# Patient Record
Sex: MALE | Race: BLACK OR AFRICAN AMERICAN | NOT HISPANIC OR LATINO | Employment: FULL TIME | URBAN - METROPOLITAN AREA
[De-identification: names, ages, dates, MRNs, and addresses within clinical notes are randomized per-mention and may not be internally consistent; named-entity substitution may affect disease eponyms.]

---

## 2023-05-03 ENCOUNTER — HOSPITAL ENCOUNTER (OUTPATIENT)
Dept: DATA CONVERSION | Facility: HOSPITAL | Age: 48
End: 2023-05-03
Attending: INTERNAL MEDICINE | Admitting: INTERNAL MEDICINE
Payer: MEDICAID

## 2023-05-03 DIAGNOSIS — K31.89 OTHER DISEASES OF STOMACH AND DUODENUM: ICD-10-CM

## 2023-05-03 DIAGNOSIS — R13.10 DYSPHAGIA, UNSPECIFIED: ICD-10-CM

## 2023-05-03 DIAGNOSIS — R13.19 OTHER DYSPHAGIA: ICD-10-CM

## 2023-05-03 DIAGNOSIS — K29.50 UNSPECIFIED CHRONIC GASTRITIS WITHOUT BLEEDING: ICD-10-CM

## 2023-05-03 DIAGNOSIS — Q39.6 CONGENITAL DIVERTICULUM OF ESOPHAGUS: ICD-10-CM

## 2023-05-11 LAB
COMPLETE PATHOLOGY REPORT: NORMAL
CONVERTED CLINICAL DIAGNOSIS-HISTORY: NORMAL
CONVERTED FINAL DIAGNOSIS: NORMAL
CONVERTED FINAL REPORT PDF LINK TO COPY AND PASTE: NORMAL
CONVERTED GROSS DESCRIPTION: NORMAL
CONVERTED MICROSCOPIC DESCRIPTION: NORMAL

## 2023-06-05 ENCOUNTER — OFFICE VISIT (OUTPATIENT)
Dept: PRIMARY CARE | Facility: CLINIC | Age: 48
End: 2023-06-05
Payer: MEDICAID

## 2023-06-05 VITALS
SYSTOLIC BLOOD PRESSURE: 142 MMHG | RESPIRATION RATE: 18 BRPM | TEMPERATURE: 98.2 F | DIASTOLIC BLOOD PRESSURE: 92 MMHG | OXYGEN SATURATION: 97 % | HEART RATE: 60 BPM | BODY MASS INDEX: 32.54 KG/M2 | WEIGHT: 260.3 LBS

## 2023-06-05 DIAGNOSIS — Z01.82 ENCOUNTER FOR ALLERGY TESTING: ICD-10-CM

## 2023-06-05 DIAGNOSIS — Z00.00 PHYSICAL EXAM: Primary | ICD-10-CM

## 2023-06-05 PROCEDURE — 90715 TDAP VACCINE 7 YRS/> IM: CPT | Performed by: NURSE PRACTITIONER

## 2023-06-05 PROCEDURE — 1036F TOBACCO NON-USER: CPT | Performed by: NURSE PRACTITIONER

## 2023-06-05 PROCEDURE — 90471 IMMUNIZATION ADMIN: CPT | Performed by: NURSE PRACTITIONER

## 2023-06-05 PROCEDURE — 99396 PREV VISIT EST AGE 40-64: CPT | Performed by: NURSE PRACTITIONER

## 2023-06-05 ASSESSMENT — PATIENT HEALTH QUESTIONNAIRE - PHQ9
SUM OF ALL RESPONSES TO PHQ9 QUESTIONS 1 AND 2: 0
1. LITTLE INTEREST OR PLEASURE IN DOING THINGS: NOT AT ALL
2. FEELING DOWN, DEPRESSED OR HOPELESS: NOT AT ALL

## 2023-06-05 ASSESSMENT — ENCOUNTER SYMPTOMS
WHEEZING: 0
ABDOMINAL PAIN: 0
NAUSEA: 0
STRIDOR: 0
SHORTNESS OF BREATH: 0
VOMITING: 0
COUGH: 0

## 2023-06-05 ASSESSMENT — PAIN SCALES - GENERAL: PAINLEVEL: 0-NO PAIN

## 2023-06-05 NOTE — PATIENT INSTRUCTIONS
Healthy diet and drink plenty of water.  Please have labs drawn-SEE MY CHART FOR RESULTS.  Will call with abnormal results only.  Please schedule all necessary health screenings ophthalmology and dentist.    Follow-up in 1 YEAR sooner if needed.  Call office any new concerns.

## 2023-06-05 NOTE — PROGRESS NOTES
"Subjective   Patient ID: Zen Boothe is a 47 y.o. male who presents for Annual Exam (Would like to get allergy testing. States that when he eats/drinks certain foods he feels like there is a ball in his throat. ).    HPI     Patient presents to clinic for yearly physical.  Currently does not take any prescription medications.    Today he states he is feeling well.  Patient has been evaluated in the ER several times for chest pain most recent visit March 16, 2023 cardiac work-up negative ( see ER notes).  Patient has been seen and followed up with cardiology.   He states ibuprofen helps with his chest pains.     Today patient complains of having \"heavy tonsils\" after drinking alcohol.  He states he feels as though something is stuck in his throat.  He would like to be evaluated for allergies.  He denies any difficulty breathing or difficulty swallowing.    He is without any other specific complaints or concerns.     Appetite normal bowel bladder normal.    Review of Systems   HENT:          SEE HPI   Respiratory:  Negative for cough, shortness of breath, wheezing and stridor.    Gastrointestinal:  Negative for abdominal pain, nausea and vomiting.   All other systems reviewed and are negative.      Objective   BP (!) 142/92 (BP Location: Right arm, Patient Position: Sitting, BP Cuff Size: Large adult)   Pulse 60   Temp 36.8 °C (98.2 °F) (Temporal)   Resp 18   Wt 118 kg (260 lb 4.8 oz)   SpO2 97%   BMI 32.54 kg/m²     Physical Exam  Vitals reviewed.   Constitutional:       Appearance: Normal appearance. He is not ill-appearing.   HENT:      Right Ear: Tympanic membrane and external ear normal.      Left Ear: Tympanic membrane and external ear normal.      Mouth/Throat:      Mouth: Mucous membranes are moist.      Pharynx: Oropharynx is clear.      Comments: No Uvula enlargement noted  Eyes:      Pupils: Pupils are equal, round, and reactive to light.   Cardiovascular:      Rate and Rhythm: Normal rate and " regular rhythm.   Pulmonary:      Effort: Pulmonary effort is normal.      Breath sounds: Normal breath sounds.   Abdominal:      General: Bowel sounds are normal.      Palpations: Abdomen is soft.   Musculoskeletal:         General: Normal range of motion.      Cervical back: Normal range of motion and neck supple.   Skin:     General: Skin is warm and dry.   Neurological:      Mental Status: He is alert and oriented to person, place, and time.   Psychiatric:         Mood and Affect: Mood normal.         Assessment/Plan   Problem List Items Addressed This Visit    None  Visit Diagnoses       Physical exam    -  Primary    Relevant Orders    CBC    Comprehensive Metabolic Panel    Lipid Panel    Hemoglobin A1C    Prostate Specific Antigen, Screen    Vitamin D 25-Hydroxy,Total    Syphilis Screen with Reflex    Hepatitis B Surface Antibody    Hepatitis C Antibody    C. Trachomatis / N. Gonorrhoeae, Amplified Detection    TRICH VAGINALIS, AMPLIFIED    HIV 1/2 Antigen/Antibody Screen with Reflex to Confirmation    Encounter for allergy testing        Relevant Orders    Referral to Allergy

## 2023-06-06 ENCOUNTER — LAB (OUTPATIENT)
Dept: LAB | Facility: LAB | Age: 48
End: 2023-06-06
Payer: MEDICAID

## 2023-06-06 DIAGNOSIS — Z00.00 PHYSICAL EXAM: ICD-10-CM

## 2023-06-06 LAB
ALANINE AMINOTRANSFERASE (SGPT) (U/L) IN SER/PLAS: 36 U/L (ref 10–52)
ALBUMIN (G/DL) IN SER/PLAS: 4.3 G/DL (ref 3.4–5)
ALKALINE PHOSPHATASE (U/L) IN SER/PLAS: 46 U/L (ref 33–120)
ANION GAP IN SER/PLAS: 10 MMOL/L (ref 10–20)
ASPARTATE AMINOTRANSFERASE (SGOT) (U/L) IN SER/PLAS: 30 U/L (ref 9–39)
BILIRUBIN TOTAL (MG/DL) IN SER/PLAS: 1.6 MG/DL (ref 0–1.2)
CALCIDIOL (25 OH VITAMIN D3) (NG/ML) IN SER/PLAS: 11 NG/ML
CALCIUM (MG/DL) IN SER/PLAS: 9.1 MG/DL (ref 8.6–10.3)
CARBON DIOXIDE, TOTAL (MMOL/L) IN SER/PLAS: 29 MMOL/L (ref 21–32)
CHLORIDE (MMOL/L) IN SER/PLAS: 103 MMOL/L (ref 98–107)
CHOLESTEROL (MG/DL) IN SER/PLAS: 201 MG/DL (ref 0–199)
CHOLESTEROL IN HDL (MG/DL) IN SER/PLAS: 56.7 MG/DL
CHOLESTEROL/HDL RATIO: 3.5
CREATININE (MG/DL) IN SER/PLAS: 1.06 MG/DL (ref 0.5–1.3)
ERYTHROCYTE DISTRIBUTION WIDTH (RATIO) BY AUTOMATED COUNT: 12 % (ref 11.5–14.5)
ERYTHROCYTE MEAN CORPUSCULAR HEMOGLOBIN CONCENTRATION (G/DL) BY AUTOMATED: 32.7 G/DL (ref 32–36)
ERYTHROCYTE MEAN CORPUSCULAR VOLUME (FL) BY AUTOMATED COUNT: 95 FL (ref 80–100)
ERYTHROCYTES (10*6/UL) IN BLOOD BY AUTOMATED COUNT: 4.65 X10E12/L (ref 4.5–5.9)
ESTIMATED AVERAGE GLUCOSE FOR HBA1C: 94 MG/DL
GFR MALE: 87 ML/MIN/1.73M2
GLUCOSE (MG/DL) IN SER/PLAS: 86 MG/DL (ref 74–99)
HEMATOCRIT (%) IN BLOOD BY AUTOMATED COUNT: 44.1 % (ref 41–52)
HEMOGLOBIN (G/DL) IN BLOOD: 14.4 G/DL (ref 13.5–17.5)
HEMOGLOBIN A1C/HEMOGLOBIN TOTAL IN BLOOD: 4.9 %
HEPATITIS B VIRUS SURFACE AB (MIU/ML) IN SERUM: >1000 MIU/ML
HEPATITIS C VIRUS AB PRESENCE IN SERUM: NONREACTIVE
HIV 1/ 2 AG/AB SCREEN: NONREACTIVE
LDL: 130 MG/DL (ref 0–99)
LEUKOCYTES (10*3/UL) IN BLOOD BY AUTOMATED COUNT: 4.9 X10E9/L (ref 4.4–11.3)
NRBC (PER 100 WBCS) BY AUTOMATED COUNT: 0 /100 WBC (ref 0–0)
PLATELETS (10*3/UL) IN BLOOD AUTOMATED COUNT: 215 X10E9/L (ref 150–450)
POTASSIUM (MMOL/L) IN SER/PLAS: 4 MMOL/L (ref 3.5–5.3)
PROSTATE SPECIFIC ANTIGEN,SCREEN: 0.41 NG/ML (ref 0–4)
PROTEIN TOTAL: 7.2 G/DL (ref 6.4–8.2)
SODIUM (MMOL/L) IN SER/PLAS: 138 MMOL/L (ref 136–145)
SYPHILIS TOTAL AB: NONREACTIVE
TRIGLYCERIDE (MG/DL) IN SER/PLAS: 72 MG/DL (ref 0–149)
UREA NITROGEN (MG/DL) IN SER/PLAS: 12 MG/DL (ref 6–23)
VLDL: 14 MG/DL (ref 0–40)

## 2023-06-06 PROCEDURE — 80061 LIPID PANEL: CPT

## 2023-06-06 PROCEDURE — 87389 HIV-1 AG W/HIV-1&-2 AB AG IA: CPT

## 2023-06-06 PROCEDURE — 83036 HEMOGLOBIN GLYCOSYLATED A1C: CPT

## 2023-06-06 PROCEDURE — 86803 HEPATITIS C AB TEST: CPT

## 2023-06-06 PROCEDURE — 85027 COMPLETE CBC AUTOMATED: CPT

## 2023-06-06 PROCEDURE — 86780 TREPONEMA PALLIDUM: CPT

## 2023-06-06 PROCEDURE — 87491 CHLMYD TRACH DNA AMP PROBE: CPT

## 2023-06-06 PROCEDURE — 36415 COLL VENOUS BLD VENIPUNCTURE: CPT

## 2023-06-06 PROCEDURE — 80053 COMPREHEN METABOLIC PANEL: CPT

## 2023-06-06 PROCEDURE — 86706 HEP B SURFACE ANTIBODY: CPT

## 2023-06-06 PROCEDURE — 87591 N.GONORRHOEAE DNA AMP PROB: CPT

## 2023-06-06 PROCEDURE — 87661 TRICHOMONAS VAGINALIS AMPLIF: CPT

## 2023-06-06 PROCEDURE — 84153 ASSAY OF PSA TOTAL: CPT

## 2023-06-06 PROCEDURE — 82306 VITAMIN D 25 HYDROXY: CPT

## 2023-06-07 LAB
CHLAMYDIA TRACH., AMPLIFIED: NEGATIVE
N. GONORRHEA, AMPLIFIED: NEGATIVE
TRICHOMONAS VAGINALIS: NEGATIVE

## 2023-06-08 DIAGNOSIS — E55.9 VITAMIN D DEFICIENCY: Primary | ICD-10-CM

## 2023-06-08 RX ORDER — ERGOCALCIFEROL 1.25 MG/1
50000 CAPSULE ORAL
Qty: 4 CAPSULE | Refills: 1 | Status: SHIPPED | OUTPATIENT
Start: 2023-06-08 | End: 2023-07-11 | Stop reason: ALTCHOICE

## 2023-06-09 ENCOUNTER — TELEPHONE (OUTPATIENT)
Dept: PRIMARY CARE | Facility: CLINIC | Age: 48
End: 2023-06-09
Payer: MEDICAID

## 2023-06-09 NOTE — TELEPHONE ENCOUNTER
Called and notified patient of lab results and CNP recommendations. Patient verbalized understanding.

## 2023-06-09 NOTE — TELEPHONE ENCOUNTER
----- Message from GUY Handley sent at 6/8/2023  2:15 PM EDT -----  Regarding: labs  Please notify patient with lab results.  Vitamin D level deficient we will start patient on vitamin D 50,000 units 1 tablet weekly x8 weeks then patient to start over-the-counter vitamin D3 2000 international units 1 tablet daily.  Mild elevation also noted with cholesterol level advise healthy lifestyle changes with diet low-cholesterol low-fat eating more plant-based foods and regular exercises.  Blood work also shows some mild anemia although trending upward from previous reading.  Advised patient to eat iron rich foods.  ----- Message -----  From: Lab, Background User  Sent: 6/6/2023   1:54 PM EDT  To: GUY Handley

## 2023-06-12 ENCOUNTER — CLINICAL SUPPORT (OUTPATIENT)
Dept: PRIMARY CARE | Facility: CLINIC | Age: 48
End: 2023-06-12
Payer: MEDICAID

## 2023-06-12 VITALS — DIASTOLIC BLOOD PRESSURE: 84 MMHG | SYSTOLIC BLOOD PRESSURE: 120 MMHG

## 2023-06-12 DIAGNOSIS — Z01.30 BLOOD PRESSURE CHECK: ICD-10-CM

## 2023-06-12 PROCEDURE — 99211 OFF/OP EST MAY X REQ PHY/QHP: CPT | Performed by: NURSE PRACTITIONER

## 2023-07-11 ENCOUNTER — OFFICE VISIT (OUTPATIENT)
Dept: PRIMARY CARE | Facility: CLINIC | Age: 48
End: 2023-07-11
Payer: MEDICAID

## 2023-07-11 VITALS
SYSTOLIC BLOOD PRESSURE: 132 MMHG | DIASTOLIC BLOOD PRESSURE: 88 MMHG | BODY MASS INDEX: 32.39 KG/M2 | TEMPERATURE: 97.2 F | OXYGEN SATURATION: 97 % | RESPIRATION RATE: 17 BRPM | HEART RATE: 70 BPM | WEIGHT: 259.1 LBS

## 2023-07-11 DIAGNOSIS — R52 PAIN: ICD-10-CM

## 2023-07-11 DIAGNOSIS — R07.9 CHEST PAIN, UNSPECIFIED TYPE: Primary | ICD-10-CM

## 2023-07-11 PROCEDURE — 1036F TOBACCO NON-USER: CPT | Performed by: NURSE PRACTITIONER

## 2023-07-11 PROCEDURE — 99214 OFFICE O/P EST MOD 30 MIN: CPT | Performed by: NURSE PRACTITIONER

## 2023-07-11 PROCEDURE — 93000 ELECTROCARDIOGRAM COMPLETE: CPT | Performed by: NURSE PRACTITIONER

## 2023-07-11 RX ORDER — IBUPROFEN 600 MG/1
600 TABLET ORAL EVERY 6 HOURS PRN
Qty: 40 TABLET | Refills: 0 | Status: SHIPPED | OUTPATIENT
Start: 2023-07-11 | End: 2023-08-10

## 2023-07-11 ASSESSMENT — ENCOUNTER SYMPTOMS
FATIGUE: 0
NAUSEA: 0
FEVER: 0
SHORTNESS OF BREATH: 0
COUGH: 0
VOMITING: 0
PALPITATIONS: 0

## 2023-07-11 ASSESSMENT — PAIN SCALES - GENERAL: PAINLEVEL: 0-NO PAIN

## 2023-07-11 NOTE — PROGRESS NOTES
"Subjective   Patient ID: Zen Boothe is a 47 y.o. male who presents for Follow-up (Still having chest pain. Unsure if its stress related or gas. Reports ongoing for 2 years. Comes and goes. When he starts working and moving feels it underneath the breast area.     HPI   Patient presents to clinic with complaint of \"still having chest pain\".  Patient states he can feel pain under the right breast nonradiating denies feeling short of breath denies nausea vomiting.  He rates the pain at 3 out of 4 with movement.  He admits that he has a very physical job.  Patient has been seen several times in the past for chest pain in the ER and has been referred to cardiology who suspect noncardiac etiology for patient's chest pain.  Patient had stress echo February 7, 2023 negative for ischemia.      Patient states he has been taking ibuprofen as needed and admits that it helps with the pain.        Review of Systems   Constitutional:  Negative for fatigue and fever.   Respiratory:  Negative for cough and shortness of breath.    Cardiovascular:  Positive for chest pain. Negative for palpitations and leg swelling.   Gastrointestinal:  Negative for nausea and vomiting.   All other systems reviewed and are negative.      Objective   /88 (BP Location: Left arm, Patient Position: Sitting, BP Cuff Size: Large adult)   Pulse 70   Temp 36.2 °C (97.2 °F) (Temporal)   Resp 17   Wt 118 kg (259 lb 1.6 oz)   SpO2 97%   BMI 32.39 kg/m²     Physical Exam  Constitutional:       Appearance: Normal appearance. He is not ill-appearing or toxic-appearing.   Cardiovascular:      Rate and Rhythm: Normal rate and regular rhythm.      Comments: Negative tenderness with palpation over chest wall.  Pulmonary:      Effort: Pulmonary effort is normal.      Breath sounds: Normal breath sounds.   Musculoskeletal:         General: No tenderness. Normal range of motion.   Skin:     General: Skin is warm and dry.      Coloration: Skin is not " jaundiced.   Neurological:      Mental Status: He is alert and oriented to person, place, and time.         Assessment/Plan   Problem List Items Addressed This Visit    None  Visit Diagnoses       Chest pain, unspecified type    -  Primary    Relevant Orders    ECG 12 lead (Clinic Performed) (Completed)-normal sinus rhythm without ST-T wave abnormality  Follow-up with cardiology as scheduled.  To ER if needed.    Pain        Relevant Medications    ibuprofen 600 mg tablet

## 2023-07-11 NOTE — PATIENT INSTRUCTIONS
Your EKG is normal.  Your Chest pain is likely caused by muscular pain  Ibuprofen as needed  Healthy diet and drink plenty of water.  Follow-up with cardiology as scheduled  To ER if needed.  Call office any new concerns.

## 2023-08-24 ENCOUNTER — TELEMEDICINE (OUTPATIENT)
Dept: PRIMARY CARE | Facility: CLINIC | Age: 48
End: 2023-08-24
Payer: MEDICAID

## 2023-08-24 DIAGNOSIS — M94.0 COSTOCHONDRITIS: Primary | ICD-10-CM

## 2023-08-24 DIAGNOSIS — M79.18 MUSCULOSKELETAL PAIN: ICD-10-CM

## 2023-08-24 PROCEDURE — 99212 OFFICE O/P EST SF 10 MIN: CPT | Performed by: NURSE PRACTITIONER

## 2023-08-24 RX ORDER — IBUPROFEN 800 MG/1
800 TABLET ORAL EVERY 8 HOURS PRN
Qty: 30 TABLET | Refills: 1 | Status: SHIPPED | OUTPATIENT
Start: 2023-08-24 | End: 2023-10-16 | Stop reason: SDUPTHER

## 2023-08-24 NOTE — PROGRESS NOTES
Subjective   Patient ID: Zen Boothe is a 48 y.o. male who presents for VIRTUAL VISIT.    HPI   Patient seen via virtual visit follow-up.  He states he continues to have chest burning/chest discomfort.   Patient states he has aching daily in the chest that is relieved by ibuprofen.  Pain is nonradiating and states he noticed the pain on both sides of his upper chest area.  He denies feeling short of breath nausea vomiting cough fevers.  Patient has been seen and evaluated by Cardiology twice this year-Dr. Lou, he has also had ER visits for chest pain.  Per cardiology notes suspect chest pain is not cardiac related, Stress echo shows no ischemia February 7, 2023.     He tells me he continues to work daily without problems.  He also admits he has a very physical job.    Discussed with patient possibility of chest discomfort related to anxiety.  We also discussed physical therapy.  Patient would like to have physical therapy prior to starting antianxiety medications.      Review of Systems    Virtual visit see HPI.    Objective   There were no vitals taken for this visit.    Physical Exam  Virtual visit patient appears to be in no acute distress able to speak and spoke sentences.  Assessment/Plan   Problem List Items Addressed This Visit    None  Visit Diagnoses       Costochondritis    -  Primary  Musculoskeletal pain        Relevant Medications    ibuprofen 800 mg tablet    Other Relevant Orders    Referral to Physical Therapy  Follow cardiology as needed.  To ER if needed.

## 2023-10-16 ENCOUNTER — OFFICE VISIT (OUTPATIENT)
Dept: PRIMARY CARE | Facility: CLINIC | Age: 48
End: 2023-10-16
Payer: MEDICAID

## 2023-10-16 VITALS
BODY MASS INDEX: 31.75 KG/M2 | SYSTOLIC BLOOD PRESSURE: 129 MMHG | HEART RATE: 78 BPM | TEMPERATURE: 98 F | OXYGEN SATURATION: 98 % | DIASTOLIC BLOOD PRESSURE: 75 MMHG | WEIGHT: 254 LBS

## 2023-10-16 DIAGNOSIS — K21.9 GASTROESOPHAGEAL REFLUX DISEASE WITHOUT ESOPHAGITIS: ICD-10-CM

## 2023-10-16 DIAGNOSIS — M94.0 COSTOCHONDRITIS: ICD-10-CM

## 2023-10-16 DIAGNOSIS — M79.18 MUSCULOSKELETAL PAIN: ICD-10-CM

## 2023-10-16 DIAGNOSIS — Z09 HOSPITAL DISCHARGE FOLLOW-UP: Primary | ICD-10-CM

## 2023-10-16 PROCEDURE — 99214 OFFICE O/P EST MOD 30 MIN: CPT | Performed by: NURSE PRACTITIONER

## 2023-10-16 PROCEDURE — 1036F TOBACCO NON-USER: CPT | Performed by: NURSE PRACTITIONER

## 2023-10-16 RX ORDER — IBUPROFEN 800 MG/1
800 TABLET ORAL EVERY 8 HOURS PRN
Qty: 30 TABLET | Refills: 1 | Status: SHIPPED | OUTPATIENT
Start: 2023-10-16 | End: 2023-11-10 | Stop reason: SDUPTHER

## 2023-10-16 RX ORDER — HYDROGEN PEROXIDE 3 %
20 SOLUTION, NON-ORAL MISCELLANEOUS
COMMUNITY
End: 2023-10-16 | Stop reason: DRUGHIGH

## 2023-10-16 RX ORDER — PANTOPRAZOLE SODIUM 40 MG/1
40 TABLET, DELAYED RELEASE ORAL DAILY
Qty: 30 TABLET | Refills: 3 | Status: SHIPPED | OUTPATIENT
Start: 2023-10-16 | End: 2023-11-10 | Stop reason: SDUPTHER

## 2023-10-16 ASSESSMENT — ENCOUNTER SYMPTOMS
SHORTNESS OF BREATH: 0
VOMITING: 0
ABDOMINAL PAIN: 0
NAUSEA: 0
COUGH: 0
WHEEZING: 0

## 2023-10-16 NOTE — PROGRESS NOTES
Subjective   Patient ID: Zen Boothe is a 48 y.o. male who presents for Follow-up.    HPI     Patient presents to clinic for follow-up visit.    Patient was recently seen in [facility] on 10/11/2023 for intermittent chest pain.  Twelve-lead EKG normal sinus rhythm.  Patient has had previous work-up here at  seen by cardiology with negative cardiac exams.      Today patient complains of burning in the chest states he has been taking over-the-counter Nexium with relief of symptoms.      Patient without any other specific complaints or concerns today.      Appetite normal bowel and bladder normal.    Review of Systems   Respiratory:  Negative for cough, shortness of breath and wheezing.    Cardiovascular:  Negative for chest pain.        See HPI   Gastrointestinal:  Negative for abdominal pain, nausea and vomiting.   All other systems reviewed and are negative.      Objective   /75 (BP Location: Right arm, Patient Position: Sitting, BP Cuff Size: Large adult)   Pulse 78   Temp 36.7 °C (98 °F)   Wt 115 kg (254 lb)   SpO2 98%   BMI 31.75 kg/m²     Physical Exam  Vitals reviewed.   Constitutional:       Appearance: Normal appearance. He is not ill-appearing or toxic-appearing.   Cardiovascular:      Rate and Rhythm: Normal rate and regular rhythm.   Pulmonary:      Effort: Pulmonary effort is normal.      Breath sounds: Normal breath sounds.   Skin:     General: Skin is warm and dry.   Neurological:      Mental Status: He is alert and oriented to person, place, and time.         Assessment/Plan   Problem List Items Addressed This Visit    None  Visit Diagnoses       Hospital discharge follow-up    -  Primary    Gastroesophageal reflux disease without esophagitis        Relevant Medications    pantoprazole (ProtoNix) 40 mg EC tablet  Follow-up for 6 months sooner if needed.    Costochondritis        Relevant Medications    ibuprofen 800 mg tablet    pantoprazole (ProtoNix) 40 mg EC tablet     Musculoskeletal pain        Relevant Medications    ibuprofen 800 mg tablet

## 2023-10-22 PROBLEM — R09.A2 GLOBUS SENSATION: Status: ACTIVE | Noted: 2023-10-22

## 2023-10-22 PROBLEM — B35.1 ONYCHOMYCOSIS OF TOENAIL: Status: ACTIVE | Noted: 2023-10-22

## 2023-10-22 PROBLEM — M53.86 SCIATICA ASSOCIATED WITH DISORDER OF LUMBAR SPINE: Status: ACTIVE | Noted: 2023-10-22

## 2023-10-22 PROBLEM — H61.20 CERUMEN IMPACTION: Status: ACTIVE | Noted: 2023-10-22

## 2023-10-22 PROBLEM — R30.0 DYSURIA: Status: ACTIVE | Noted: 2023-10-22

## 2023-10-22 PROBLEM — L60.8 MELANONYCHIA: Status: ACTIVE | Noted: 2023-10-22

## 2023-10-22 PROBLEM — M54.9 CHRONIC BACK PAIN: Status: ACTIVE | Noted: 2023-10-22

## 2023-10-22 PROBLEM — M65.4 DE QUERVAIN'S TENOSYNOVITIS: Status: ACTIVE | Noted: 2023-10-22

## 2023-10-22 PROBLEM — L73.1 PSEUDOFOLLICULITIS BARBAE: Status: ACTIVE | Noted: 2023-10-22

## 2023-10-22 PROBLEM — G56.03 BILATERAL CARPAL TUNNEL SYNDROME: Status: ACTIVE | Noted: 2022-03-02

## 2023-10-22 PROBLEM — E55.9 VITAMIN D DEFICIENCY: Status: ACTIVE | Noted: 2023-10-22

## 2023-10-22 PROBLEM — G89.29 CHRONIC BACK PAIN: Status: ACTIVE | Noted: 2023-10-22

## 2023-10-22 PROBLEM — R59.0 LYMPHADENOPATHY OF RIGHT CERVICAL REGION: Status: ACTIVE | Noted: 2023-10-22

## 2023-10-22 RX ORDER — CLINDAMYCIN PHOSPHATE 10 MG/G
GEL TOPICAL
COMMUNITY

## 2023-10-22 RX ORDER — BENZOYL PEROXIDE 10 G/100G
GEL TOPICAL DAILY
COMMUNITY
End: 2024-02-05 | Stop reason: ALTCHOICE

## 2023-10-24 ENCOUNTER — APPOINTMENT (OUTPATIENT)
Dept: PHYSICAL THERAPY | Facility: CLINIC | Age: 48
End: 2023-10-24
Payer: MEDICAID

## 2023-10-26 ENCOUNTER — EVALUATION (OUTPATIENT)
Dept: PHYSICAL THERAPY | Facility: CLINIC | Age: 48
End: 2023-10-26
Payer: MEDICAID

## 2023-10-26 DIAGNOSIS — M94.0 COSTOCHONDRITIS: Primary | ICD-10-CM

## 2023-10-26 PROCEDURE — 97161 PT EVAL LOW COMPLEX 20 MIN: CPT | Mod: GP

## 2023-10-26 PROCEDURE — 97110 THERAPEUTIC EXERCISES: CPT | Mod: GP

## 2023-10-26 NOTE — PROGRESS NOTES
Physical Therapy    Physical Therapy Evaluation    Patient Name: Zen Boothe  MRN: [id number]  Today's Date: 10/26/23  Time Calculation  Start Time: 0830 (Patient arrived late to evaluation.)  Stop Time: 0900  Time Calculation (min): 30 min    Therapy Diagnoses:    1. Costochondritis  Follow Up In Physical Therapy          Visit #: 1     Insurance:  -authorization required: Yes  -number of visits authorized: TBD  -authorization dates: TBD      Subjective:  Reason For Visit: Initial Evaluation  Referred by: Kerline Garza, ROSALIND-CNP   - CC: Patient arrives with complaint of chest/rib pain.   - ADDITIONAL: Patient reports intermittent and wondering pain/sensation of heaviness. Most commonly experiences pain under R axilla and inferior portion of R rib cage, but occasionally travels under L axilla as well. Sensations of heaviness over sternum are light, but constant. Bouts of symptoms can be relieved for up to 2 months at time before returning.   - DOI: 10/01/2022  - FINESSE: Insidious onset  - IMAGING: x-ray (No evidence of acute cardiopulmonary process)   - HX: has received ECG and EGD performed within last 6 months  - PAIN: CURRENT: (5/10); BEST: (0/10); WORST: (5/10); LOCATION: (rib cage ASA); Description: sharp  - ALLEVIATES PAIN: ibuprofen  - EXACERBATES PAIN: none  - CURRENT MEDICAL MANAGEMENT: has had multiple cardiac tests performed without significant findings and recently started heart burn medication per patient. Has went to ER multiple times for work up due to pain.   - PLOF: Patient reports no functional limitations prior to injury.  - FUNCTIONAL LIMITATIONS: No limits.   - LIVING ENVIRONMENT: No concerns.   - WORK: Heavy lifting required. Long hours in forward flexed position  - EXERCISE: Has not exercised in 4 months.   - PATIENT'S GOAL: Reduce pain        Precautions:  Fall Risk: None  Cancer (unspecified), diabetes       Objective:   QuickDASH: 35    OBSERVATION: Slight yellow discoloration of  sclera.     POSTURE: very poor - forward rounded shoulders    AROM (values in degrees)   Scaption R/L: 150 / 160  ER R/L: WNL  ER in 90 deg R/L: WNL   IR (Apley) R/L: WNL     MMT (values out of 5)   Flexion R/L: 5 / 5  Abduction R/L: 5 / 5  Adduction R/L: 5 / 5  ER R/L: 5 / 5  IR R/L: 5 / 5  Extension R/L: 5 / 5  Elbow flexion R/L: 5 / 5  Elbow extension R/L: 5 / 5    PALPATION: Denies pain with palpation throughout intercostals, costal facets, and sternum on R.       FLEXIBILITY: Patient demoed decreased flexibility within bilateral Ue's and reports aching sensation in throughout rib cage with stretching.       Goals:  -QuickDash= </= 20 % disability to indicate a significant improvement in overall function.   -Patient will demo correct posture with min to no cueing to allow for correct loading strategy   -Patient will demo mild to no limitation AROM of the R shoulder to allow for correct mechanics with functional mobility.   -Patient will demo pushing, pulling, and lifting with good mechanics to avoid future discomfort or injury to the shoulder.   -Patient will report no more than 1/10 with all activities required for job and household chores.       Treatment:   1) Initial evaluation   2) Patient educated on POC, HEP, and current condition.   3) Therapeutic exercise performed  4) HEP Provided (See Below)     Access Code: [email or web address]  URL: [email or web address]  Date: 10/26/2023  Prepared by: Jayson Berger    Exercises  - Seated Scapular Retraction  - 4-6 x daily - 7 x weekly - 10 reps - 3 sec hold  - Doorway Pec Stretch at 60 Degrees Abduction with Arm Straight  - 2 x daily - 7 x weekly - 2 sets - 30 sec hold  - Seated Thoracic Lumbar Extension with Pectoralis Stretch  - 2 x daily - 7 x weekly - 3 sets - 10 reps - 3 sec hold  - Standing Shoulder Horizontal Abduction with Resistance  - 1 x daily - 7 x weekly - 3 sets - 10 reps      Assessment:   Zen Boothe is a 48 y.o. year old male who  participated in a physical therapy evaluation today due to complaint of chest/rib pain R>L. Patient reports intermittent and wondering pain/sensation of heaviness. Most commonly experiences pain under R axilla and inferior portion of R rib cage, but occasionally travels under L axilla as well. Sensations of heaviness over sternum are light, but constant. Bouts of symptoms can be relieved for up to 2 months at time before returning. No known cause. Familiar symptoms unable to be provoked during today's session, however, patient reported feelings of tightness and aching in affected areas with postural corrections. Based on extensive patient workup and stability of symptoms over the past year, the patient is safe to see PT with monitoring for symptom changes and red flag development. Their impairments include Pain, Active ROM, Passive ROM, Flexibility, and Joint mobility. Zen will benefit from continued skilled physical therapy as well as development of a home exercise program to address current impairments and progress towards return to their prior level of function without pain/limitations.     Plan:     Recommended Treatment:  Therapeutic exercise, Manual therapy, Home program instruction and progression, Neuromuscular re-education, Therapeutic activities, Instruction in activity modification, Cryotherapy, and Dry Needling    Recommended Visits: 1 visits x 4-6 weeks     Patient in agreement with POC.

## 2023-10-26 NOTE — Clinical Note
October 26, 2023     Patient: Zen Boothe   YOB: 1975   Date of Visit: 10/26/2023       To Whom It May Concern:    It is my medical opinion that Zen Boothe {Work release (duty restriction):35190}.    If you have any questions or concerns, please don't hesitate to call.         Sincerely,        Jayson Berger, PT    CC: No Recipients

## 2023-10-26 NOTE — Clinical Note
October 26, 2023     Patient: Zen Boothe   YOB: 1975   Date of Visit: 10/26/2023       To Whom it May Concern:    Zen Boothe was seen in my clinic on 10/26/2023. He {Return to school/sport:36697}.    If you have any questions or concerns, please don't hesitate to call.         Sincerely,          Jayson Berger, PT        CC: No Recipients

## 2023-11-06 ENCOUNTER — DOCUMENTATION (OUTPATIENT)
Dept: PHYSICAL THERAPY | Facility: CLINIC | Age: 48
End: 2023-11-06
Payer: MEDICAID

## 2023-11-06 NOTE — PROGRESS NOTES
Physical Therapy                 Therapy Communication Note    Patient Name: Zen Boothe  MRN: [id number]  Today's Date: 11/6/2023     Discipline: Physical Therapy    Missed Visit Reason:  Forgot    Missed Time: No Show    Comment: Called patient. He reported forgetting appointment and states he will be at next appointment. Was provided date/time of next appointment.

## 2023-11-10 DIAGNOSIS — M79.18 MUSCULOSKELETAL PAIN: ICD-10-CM

## 2023-11-10 DIAGNOSIS — K21.9 GASTROESOPHAGEAL REFLUX DISEASE WITHOUT ESOPHAGITIS: ICD-10-CM

## 2023-11-10 DIAGNOSIS — M94.0 COSTOCHONDRITIS: ICD-10-CM

## 2023-11-10 RX ORDER — PANTOPRAZOLE SODIUM 40 MG/1
40 TABLET, DELAYED RELEASE ORAL DAILY
Qty: 90 TABLET | Refills: 1 | Status: SHIPPED | OUTPATIENT
Start: 2023-11-10 | End: 2024-02-05 | Stop reason: ALTCHOICE

## 2023-11-10 RX ORDER — IBUPROFEN 800 MG/1
800 TABLET ORAL EVERY 8 HOURS PRN
Qty: 30 TABLET | Refills: 2 | Status: SHIPPED | OUTPATIENT
Start: 2023-11-10 | End: 2024-05-31 | Stop reason: SDUPTHER

## 2023-11-10 NOTE — TELEPHONE ENCOUNTER
-abnormal C spine Xray with possible signs of C spine instability, MRi ordered and patient refused 2/17, on pain meds, discussed AT length, see 2/17 and 2/18 note above, ordered with premediation now. Concern for possible infection also given acuity of pain  -signs of C spine instability, prevertebral abscess likely with fluid collection with widening in C spine, NS recs C collar- patient refused, and Xrays, refused, and MRi full spine when stable. Cannot do any intervention now as unstable bc of kidneys.   -ent consulted for retropharyngeal abscess per ns and id recs, will likely scope if amenable at bedside (he refused). They rec MRI C spien with contrast- but cannot get due to kidneys  -refused c collar xrays  -NS recs MRI all spine, ID/ENT rec repeat MRI C spine for better pictures. (cannot do contrast). He is likely nto going to sit for all the spine right now 2/21 per my exam and nursing. Placed repeat flex xrays and C spine MRi orders in now. They called him at 3 pm but anesthesia is coming at 330 pm to do the line he said he would now do this afternoon after a cigarette, so nursing to tell them to come back. IR consulted to sample the vertebral involvement, they report they wanted to review further imaging once in to see if they needed - will f/u thoughts but likely suspect wont need sampling of the spine as this appear more chroinc, retropharyngeal collection however likely needs further addressed. CT also pending as rec for ENT as well.   -ID following- on dapto and Ceftriaxone     Patient wanted you to give him a call. I think he wanted a refill on 2 of his medications. Was hard to understand him, but he requested to talk to you

## 2023-11-10 NOTE — TELEPHONE ENCOUNTER
Rx Refill Request Telephone Encounter    Name:  Zen Boothe  :  [id number]  Medication Name:    Requested Prescriptions     Pending Prescriptions Disp Refills    pantoprazole (ProtoNix) 40 mg EC tablet 90 tablet 3     Sig: Take 1 tablet (40 mg) by mouth once daily. Do not crush, chew, or split.    ibuprofen 800 mg tablet 30 tablet 3     Sig: Take 1 tablet (800 mg) by mouth every 8 hours if needed for mild pain (1 - 3) or moderate pain (4 - 6) (pain). TAKE WITH FOOD   Specific Pharmacy location:    [id number] [place] [id number] [facility] [address]  Phone: [phone] Fax: [phone]  Date of last appointment:  10/16/2023  Date of next appointment:  2024 w/ Dr Sarmiento  Best number to reach patient:  [phone]

## 2023-11-14 ENCOUNTER — TREATMENT (OUTPATIENT)
Dept: PHYSICAL THERAPY | Facility: CLINIC | Age: 48
End: 2023-11-14
Payer: MEDICAID

## 2023-11-14 DIAGNOSIS — M94.0 COSTOCHONDRITIS: ICD-10-CM

## 2023-11-14 PROCEDURE — 97110 THERAPEUTIC EXERCISES: CPT | Mod: GP

## 2023-11-14 NOTE — PROGRESS NOTES
"Physical Therapy Treatment     Patient Name:   MRN: [id number]  Today's Date: 11/14/23  Time Calculation  Start Time: 0835  Stop Time: 0914  Time Calculation (min): 39 min    Visit #: 2 of 6    Insurance:  -authorization required: Yes  -number of visits authorized: 6  -authorization dates: 10/26/2023 - 12/29/2023    CURRENT PROBLEM  1. Costochondritis  Follow Up In Physical Therapy          ASSESSMENT    - Presented without pain and reporting min improvement throughout the week  - Progressed through introduction to open book thoracic extensions, ER pull apart, and increased intensity with familiar exercises   - Min R rib discomfort provoked with R rotation during open books, so instructions were provided to avoid exercise if discomfort progresses   - Today's session was tolerated well without any increase in pain or irritation.  - At this time, the patient would continue to benefit from skilled PT to address remaining functional, objective and subjective deficits to allow them to return to their prior level of function.            PLAN  Monitor rib pain and F/U with results of new HEP      SUBJECTIVE  Patient arrived reporting a little improvement in rib pain. Has no rib pain today, but feels as if something is stuck in mid chest. Has had GI and cardiac work up previously. HEP going well.     Pain  0/10 R rib pain     Precautions:  Fall Risk: None  No Hx of cancer (cancer miss marked on intake; clarified by patient on 11/14/2023), diabetes      OBJECTIVE      TREATMENT    Therapeutic Exercise:     39 minutes  Access Code: [email or web address]  UEB level 6 x 5 min F/B   Doorway Pec Stretch low 2 x 30\"   Doorway Pec Stretch mid \"W\" 2 x 30\"   Doorway Pec Stretch high 2 x 30\"   Seated Thoracic Extension on chair 3\" hold x 12   Open book thoracic rotation 2 x 10 R/L   Standing Shoulder Horizontal Abd pull apart with blue TB 2 x 12   Standing ER pull apart green TB 2 x 12   Standing ext with black TB 2 x 12     Manual Therapy:        " minutes  Not performed today

## 2023-11-19 ENCOUNTER — APPOINTMENT (OUTPATIENT)
Dept: RADIOLOGY | Facility: HOSPITAL | Age: 48
End: 2023-11-19
Payer: MEDICAID

## 2023-11-19 ENCOUNTER — HOSPITAL ENCOUNTER (EMERGENCY)
Facility: HOSPITAL | Age: 48
Discharge: HOME | End: 2023-11-19
Payer: MEDICAID

## 2023-11-19 VITALS
HEIGHT: 75 IN | HEART RATE: 74 BPM | BODY MASS INDEX: 32.08 KG/M2 | DIASTOLIC BLOOD PRESSURE: 95 MMHG | SYSTOLIC BLOOD PRESSURE: 164 MMHG | WEIGHT: 258 LBS | OXYGEN SATURATION: 98 % | TEMPERATURE: 97.9 F | RESPIRATION RATE: 18 BRPM

## 2023-11-19 DIAGNOSIS — R07.9 CHEST PAIN, UNSPECIFIED TYPE: Primary | ICD-10-CM

## 2023-11-19 LAB
ALBUMIN SERPL BCP-MCNC: 4.3 G/DL (ref 3.4–5)
ALP SERPL-CCNC: 56 U/L (ref 33–120)
ALT SERPL W P-5'-P-CCNC: 24 U/L (ref 10–52)
ANION GAP SERPL CALC-SCNC: 10 MMOL/L (ref 10–20)
AST SERPL W P-5'-P-CCNC: 21 U/L (ref 9–39)
BASOPHILS # BLD AUTO: 0.03 X10*3/UL (ref 0–0.1)
BASOPHILS NFR BLD AUTO: 0.6 %
BILIRUB SERPL-MCNC: 0.6 MG/DL (ref 0–1.2)
BUN SERPL-MCNC: 17 MG/DL (ref 6–23)
CALCIUM SERPL-MCNC: 8.9 MG/DL (ref 8.6–10.3)
CARDIAC TROPONIN I PNL SERPL HS: 7 NG/L (ref 0–20)
CHLORIDE SERPL-SCNC: 105 MMOL/L (ref 98–107)
CO2 SERPL-SCNC: 28 MMOL/L (ref 21–32)
CREAT SERPL-MCNC: 1.05 MG/DL (ref 0.5–1.3)
EOSINOPHIL # BLD AUTO: 0.07 X10*3/UL (ref 0–0.7)
EOSINOPHIL NFR BLD AUTO: 1.3 %
ERYTHROCYTE [DISTWIDTH] IN BLOOD BY AUTOMATED COUNT: 12.2 % (ref 11.5–14.5)
GFR SERPL CREATININE-BSD FRML MDRD: 88 ML/MIN/1.73M*2
GLUCOSE SERPL-MCNC: 108 MG/DL (ref 74–99)
HCT VFR BLD AUTO: 40.6 % (ref 41–52)
HGB BLD-MCNC: 13.6 G/DL (ref 13.5–17.5)
IMM GRANULOCYTES # BLD AUTO: 0.01 X10*3/UL (ref 0–0.7)
IMM GRANULOCYTES NFR BLD AUTO: 0.2 % (ref 0–0.9)
LIPASE SERPL-CCNC: 39 U/L (ref 9–82)
LYMPHOCYTES # BLD AUTO: 1.96 X10*3/UL (ref 1.2–4.8)
LYMPHOCYTES NFR BLD AUTO: 37.1 %
MCH RBC QN AUTO: 32.3 PG (ref 26–34)
MCHC RBC AUTO-ENTMCNC: 33.5 G/DL (ref 32–36)
MCV RBC AUTO: 96 FL (ref 80–100)
MONOCYTES # BLD AUTO: 0.42 X10*3/UL (ref 0.1–1)
MONOCYTES NFR BLD AUTO: 8 %
NEUTROPHILS # BLD AUTO: 2.79 X10*3/UL (ref 1.2–7.7)
NEUTROPHILS NFR BLD AUTO: 52.8 %
NRBC BLD-RTO: 0 /100 WBCS (ref 0–0)
PLATELET # BLD AUTO: 202 X10*3/UL (ref 150–450)
POTASSIUM SERPL-SCNC: 3.8 MMOL/L (ref 3.5–5.3)
PROT SERPL-MCNC: 7.3 G/DL (ref 6.4–8.2)
RBC # BLD AUTO: 4.21 X10*6/UL (ref 4.5–5.9)
SODIUM SERPL-SCNC: 139 MMOL/L (ref 136–145)
WBC # BLD AUTO: 5.3 X10*3/UL (ref 4.4–11.3)

## 2023-11-19 PROCEDURE — 96374 THER/PROPH/DIAG INJ IV PUSH: CPT

## 2023-11-19 PROCEDURE — 36415 COLL VENOUS BLD VENIPUNCTURE: CPT | Performed by: NURSE PRACTITIONER

## 2023-11-19 PROCEDURE — 99285 EMERGENCY DEPT VISIT HI MDM: CPT | Mod: 25

## 2023-11-19 PROCEDURE — 71046 X-RAY EXAM CHEST 2 VIEWS: CPT | Performed by: STUDENT IN AN ORGANIZED HEALTH CARE EDUCATION/TRAINING PROGRAM

## 2023-11-19 PROCEDURE — 71046 X-RAY EXAM CHEST 2 VIEWS: CPT | Mod: FY

## 2023-11-19 PROCEDURE — 85025 COMPLETE CBC W/AUTO DIFF WBC: CPT | Performed by: NURSE PRACTITIONER

## 2023-11-19 PROCEDURE — 76705 ECHO EXAM OF ABDOMEN: CPT

## 2023-11-19 PROCEDURE — 80053 COMPREHEN METABOLIC PANEL: CPT | Performed by: NURSE PRACTITIONER

## 2023-11-19 PROCEDURE — 2500000004 HC RX 250 GENERAL PHARMACY W/ HCPCS (ALT 636 FOR OP/ED): Performed by: PHYSICIAN ASSISTANT

## 2023-11-19 PROCEDURE — 76705 ECHO EXAM OF ABDOMEN: CPT | Performed by: STUDENT IN AN ORGANIZED HEALTH CARE EDUCATION/TRAINING PROGRAM

## 2023-11-19 PROCEDURE — 84484 ASSAY OF TROPONIN QUANT: CPT | Performed by: NURSE PRACTITIONER

## 2023-11-19 PROCEDURE — 83690 ASSAY OF LIPASE: CPT | Performed by: PHYSICIAN ASSISTANT

## 2023-11-19 RX ORDER — NAPROXEN 500 MG/1
500 TABLET ORAL
Qty: 30 TABLET | Refills: 0 | Status: SHIPPED | OUTPATIENT
Start: 2023-11-19 | End: 2023-12-04

## 2023-11-19 RX ORDER — KETOROLAC TROMETHAMINE 30 MG/ML
15 INJECTION, SOLUTION INTRAMUSCULAR; INTRAVENOUS ONCE
Status: COMPLETED | OUTPATIENT
Start: 2023-11-19 | End: 2023-11-19

## 2023-11-19 RX ADMIN — KETOROLAC TROMETHAMINE 15 MG: 30 INJECTION INTRAMUSCULAR; INTRAVENOUS at 21:37

## 2023-11-19 ASSESSMENT — LIFESTYLE VARIABLES
EVER FELT BAD OR GUILTY ABOUT YOUR DRINKING: NO
HAVE YOU EVER FELT YOU SHOULD CUT DOWN ON YOUR DRINKING: NO
EVER HAD A DRINK FIRST THING IN THE MORNING TO STEADY YOUR NERVES TO GET RID OF A HANGOVER: NO
REASON UNABLE TO ASSESS: NO
HAVE PEOPLE ANNOYED YOU BY CRITICIZING YOUR DRINKING: NO

## 2023-11-19 ASSESSMENT — COLUMBIA-SUICIDE SEVERITY RATING SCALE - C-SSRS
1. IN THE PAST MONTH, HAVE YOU WISHED YOU WERE DEAD OR WISHED YOU COULD GO TO SLEEP AND NOT WAKE UP?: NO
6. HAVE YOU EVER DONE ANYTHING, STARTED TO DO ANYTHING, OR PREPARED TO DO ANYTHING TO END YOUR LIFE?: NO
2. HAVE YOU ACTUALLY HAD ANY THOUGHTS OF KILLING YOURSELF?: NO

## 2023-11-19 ASSESSMENT — PAIN SCALES - GENERAL
PAINLEVEL_OUTOF10: 8
PAINLEVEL_OUTOF10: 3

## 2023-11-19 ASSESSMENT — PAIN - FUNCTIONAL ASSESSMENT: PAIN_FUNCTIONAL_ASSESSMENT: 0-10

## 2023-11-20 ENCOUNTER — TREATMENT (OUTPATIENT)
Dept: PHYSICAL THERAPY | Facility: CLINIC | Age: 48
End: 2023-11-20
Payer: MEDICAID

## 2023-11-20 DIAGNOSIS — M94.0 COSTOCHONDRITIS: ICD-10-CM

## 2023-11-20 PROCEDURE — 97110 THERAPEUTIC EXERCISES: CPT | Mod: GP

## 2023-11-20 PROCEDURE — 97140 MANUAL THERAPY 1/> REGIONS: CPT | Mod: GP

## 2023-11-20 NOTE — ED TRIAGE NOTES
This patient was seen and examined in triage    HPI:  Patient is a healthy nontoxic-appearing 48-year-old male with past medical history of cerumen impaction, dysuria, de Quervain's tenosynovitis, globus sensation, leukopenia, vitamin D deficiency, sciatica, presents the emergency room today complaint of chest pain.  Patient states of the past 2 years he has had right-sided chest discomfort.  Patient states has been fairly constant for this timeframe.  Patient denies any injuries trauma or falls radiation of pain, alleviating or worsening factors.  Patient denies any abdominal pain with nausea, vomiting, diarrhea or constipation.  Patient denies any fever, shaking, or chills.    Focused PE:  Gen: Well-appearing, not in acute distress  Cardiovascular: Regular rate, normal rhythm, no murmur, no gallop  Respiratory: No adventitious lung sounds auscultated.  Abdomen: No reproducible abdominal tenderness upon palpation,  physical exam may be limited by patient positioning sitting up in a chair  Neuro:  Alert and Oriented, speech clear and coherent    Plan:  Cardiac evaluation entered from triage.    For the remainder the patient's work-up and ED course, please see the main ED provider note.  We discussed need for diagnostic testing including lab studies and imaging.  We also discussed that they may be asked to wait in the waiting room while these test are pending.  They understand that if they choose to leave without having the testing completed or resulted that we cannot rule out acute life-threatening illnesses and the risks involved to lead to worsening condition, permanent disability or even death.    
4 = No assist / stand by assistance

## 2023-11-20 NOTE — ED PROVIDER NOTES
HPI   Chief Complaint   Patient presents with   • Chest Pain     Right side chest pain, states has been dealing with for 2 years .       48-year-old male presents today complaining of right-sided chest discomfort.  The patient states symptoms for the past 2 years.  He reports that his pain is slightly worse after meals.  He reports that he has been seen and evaluated numerous times for the discomfort however there has never been a diagnosis.  He denies any sensation of ripping or tearing pain.  No reports of shortness of breath or difficulty breathing.  Denies any cough, hemoptysis, fever, or myalgias                          No data recorded                Patient History   Past Medical History:   Diagnosis Date   • Bilateral carpal tunnel syndrome    • History of dysuria    • History of sciatica    • History of solitary pulmonary nodule    • Intermittent chest pain    • Leukopenia    • Lumbosacral radiculitis    • Lumbosacral strain    • Radial styloid tenosynovitis (de quervain)     De Quervain's tenosynovitis   • Tinea unguium     Onychomycosis of toenail     Past Surgical History:   Procedure Laterality Date   • WISDOM TOOTH EXTRACTION       Family History   Problem Relation Name Age of Onset   • Hypertension Mother     • Diabetes Mother     • No Known Problems Father     • No Known Problems Sister     • No Known Problems Brother     • No Known Problems Daughter     • No Known Problems Son     • No Known Problems Mother's Sister     • No Known Problems Mother's Brother     • No Known Problems Father's Sister     • Heart attack Father's Brother     • Coronary artery disease Maternal Grandmother     • Rheumatic fever Maternal Grandmother     • No Known Problems Maternal Grandfather     • No Known Problems Paternal Grandmother     • No Known Problems Paternal Grandfather       Social History     Tobacco Use   • Smoking status: Former     Types: Cigarettes     Quit date:      Years since quittin.8      Passive exposure: Past   • Smokeless tobacco: Never   Substance Use Topics   • Alcohol use: Not on file   • Drug use: Not on file       Physical Exam   ED Triage Vitals [11/19/23 1925]   Temp Heart Rate Resp BP   36.6 °C (97.9 °F) 60 18 154/79      SpO2 Temp src Heart Rate Source Patient Position   96 % -- -- --      BP Location FiO2 (%)     -- --       Physical Exam  Vitals and nursing note reviewed.   Constitutional:       General: He is not in acute distress.     Appearance: Normal appearance. He is normal weight. He is not ill-appearing, toxic-appearing or diaphoretic.   HENT:      Head: Normocephalic.      Nose: Nose normal.      Mouth/Throat:      Mouth: Mucous membranes are moist.   Eyes:      Extraocular Movements: Extraocular movements intact.      Conjunctiva/sclera: Conjunctivae normal.   Cardiovascular:      Rate and Rhythm: Normal rate and regular rhythm.      Pulses: Normal pulses.   Pulmonary:      Effort: Pulmonary effort is normal. No respiratory distress.      Breath sounds: Normal breath sounds.   Abdominal:      General: Abdomen is flat. Bowel sounds are normal. There is no distension.      Palpations: Abdomen is soft.      Tenderness: There is no guarding or rebound.   Musculoskeletal:         General: Normal range of motion.      Cervical back: Normal range of motion and neck supple.   Skin:     General: Skin is warm and dry.      Capillary Refill: Capillary refill takes less than 2 seconds.   Neurological:      General: No focal deficit present.      Mental Status: He is alert and oriented to person, place, and time.   Psychiatric:         Mood and Affect: Mood normal.         Behavior: Behavior normal.         Thought Content: Thought content normal.         Judgment: Judgment normal.         ED Course & MDM   ED Course as of 11/20/23 0235   Sun Nov 19, 2023 2136 LIPASE: 39 [DS]   2136 Troponin I, High Sensitivity: 7 [DS]   2136 WBC: 5.3 [DS]   2136 ALT: 24 [DS]   2136 Bilirubin, Total  : 0.6 [DS]    AST: 21 [DS]    Creatinine: 1.05 [DS]    US IMPRESSION:  Unremarkable ultrasound of the right upper quadrant.   [DS]      ED Course User Index  [DS] Orlando Moore PA-C         Diagnoses as of 23   Chest pain, unspecified type       Medical Decision Making  EKG was obtained and interpreted by myself.  He EKG sinus bradycardia rate of 59 with no evidence of acute STEMI.  QTc 403.  No ectopy.  Left axis deviation present.  Blood work was nearly unremarkable.  Patient was found to have some very mild tenderness to palpation of the right upper quadrant.  Given this along with the report of postprandial pain ultrasound was obtained and revealed no evidence of acute cholecystitis.  Patient was notified of the findings and verbalized understanding.  At this point believe the patient is appropriate for discharge.  He does not require delta troponin.  Patient is also PERC negative and does not require further work-up for pulmonary embolism.  Recommended that he follow-up closely with his primary care physician        Procedure  Procedures     Orlando Moore PA-C  23

## 2023-11-20 NOTE — PROGRESS NOTES
"Physical Therapy Treatment     Patient Name:   MRN: [id number]  Today's Date: 11/20/23       Visit #: 3 of 6    Insurance:  -authorization required: Yes  -number of visits authorized: 6  -authorization dates: 10/26/2023 - 12/29/2023    CURRENT PROBLEM  1. Costochondritis  Follow Up In Physical Therapy          ASSESSMENT    - Presented with 3 pain   - Not Progressed with resistance today,   -Added prone YTI flys, sidelying ER today, patient was challenged by YTI flys.  - R rib discomfort not provoked with R rotation during open books,   - Today's session was tolerated well without any increase in pain or irritation.  - At this time, the patient would continue to benefit from skilled PT to address remaining functional, objective and subjective deficits to allow them to return to their prior level of function.            PLAN  Monitor rib pain and F/U with results of new HEP      SUBJECTIVE  Patient reports of  increased pain last night 8/10, he went to emergency room.  Has 3/10 rib pain today, but feels as if something is stuck in mid chest. Has had GI and cardiac work up previously. HEP going well.     Pain  3/10 R rib pain     Precautions:  Fall Risk: None  No Hx of cancer (cancer miss marked on intake; clarified by patient on 11/14/2023), diabetes      OBJECTIVE  Palpation:  Trigger points felt at the inferior portion of R. Rib cage, under the axilla.  rend    TREATMENT    Therapeutic Exercise:       minutes  Access Code: [email or web address]  UEB level 6 x 5 min F/B   Doorway Pec Stretch low 2 x 30\"   Doorway Pec Stretch mid \"W\" 2 x 30\"   Doorway Pec Stretch high 2 x 30\"   Seated Thoracic Extension on chair 3\" hold x 12   Open book thoracic rotation 2 x 10 R/L   Standing Shoulder Horizontal Abd pull apart with green TB 2 x 12   Standing ER pull apart green TB 2 x 12   Standing ext with black TB 2 x 12   Prone YTI flys bilaterally x 12 each  Sidelying ER x 12  Bent over rows with 5# dumbbell x 10  Lateral overhead stretch 30 " sec hold x 2    Manual Therapy:        minutes  STM with a roller and trigger point release x 10 min

## 2023-11-27 ENCOUNTER — APPOINTMENT (OUTPATIENT)
Dept: PHYSICAL THERAPY | Facility: CLINIC | Age: 48
End: 2023-11-27
Payer: MEDICAID

## 2023-11-28 ENCOUNTER — TELEPHONE (OUTPATIENT)
Dept: PRIMARY CARE | Facility: CLINIC | Age: 48
End: 2023-11-28
Payer: MEDICAID

## 2023-11-28 PROCEDURE — 99285 EMERGENCY DEPT VISIT HI MDM: CPT | Mod: 25

## 2023-11-28 PROCEDURE — 99284 EMERGENCY DEPT VISIT MOD MDM: CPT | Mod: 25 | Performed by: INTERNAL MEDICINE

## 2023-11-28 PROCEDURE — 96374 THER/PROPH/DIAG INJ IV PUSH: CPT

## 2023-11-28 ASSESSMENT — PAIN SCALES - GENERAL: PAINLEVEL_OUTOF10: 8

## 2023-11-28 ASSESSMENT — PAIN - FUNCTIONAL ASSESSMENT: PAIN_FUNCTIONAL_ASSESSMENT: 0-10

## 2023-11-28 NOTE — TELEPHONE ENCOUNTER
PATIENT CALLED AND WANTED YOU TO CALL HIM BACK, HE SAID HE WANTED TO TALK TO YOU ABOUT HIS HEALTH FIRST BEFORE HE MADE AN APPOINTMENT. PATIENT DIDN'T TELL ME EXACTLY WHAT

## 2023-11-29 ENCOUNTER — APPOINTMENT (OUTPATIENT)
Dept: CARDIOLOGY | Facility: HOSPITAL | Age: 48
End: 2023-11-29
Payer: MEDICAID

## 2023-11-29 ENCOUNTER — HOSPITAL ENCOUNTER (EMERGENCY)
Facility: HOSPITAL | Age: 48
Discharge: HOME | End: 2023-11-29
Attending: INTERNAL MEDICINE
Payer: MEDICAID

## 2023-11-29 ENCOUNTER — APPOINTMENT (OUTPATIENT)
Dept: RADIOLOGY | Facility: HOSPITAL | Age: 48
End: 2023-11-29
Payer: MEDICAID

## 2023-11-29 VITALS
OXYGEN SATURATION: 98 % | DIASTOLIC BLOOD PRESSURE: 61 MMHG | TEMPERATURE: 97.5 F | RESPIRATION RATE: 18 BRPM | HEART RATE: 65 BPM | WEIGHT: 256 LBS | HEIGHT: 75 IN | SYSTOLIC BLOOD PRESSURE: 143 MMHG | BODY MASS INDEX: 31.83 KG/M2

## 2023-11-29 DIAGNOSIS — R07.9 CHEST PAIN, UNSPECIFIED TYPE: Primary | ICD-10-CM

## 2023-11-29 DIAGNOSIS — K29.70 GASTRITIS WITHOUT BLEEDING, UNSPECIFIED CHRONICITY, UNSPECIFIED GASTRITIS TYPE: Primary | ICD-10-CM

## 2023-11-29 DIAGNOSIS — R10.13 EPIGASTRIC PAIN: ICD-10-CM

## 2023-11-29 LAB
ALBUMIN SERPL BCP-MCNC: 4.3 G/DL (ref 3.4–5)
ALP SERPL-CCNC: 47 U/L (ref 33–120)
ALT SERPL W P-5'-P-CCNC: 27 U/L (ref 10–52)
ANION GAP SERPL CALC-SCNC: 10 MMOL/L (ref 10–20)
AST SERPL W P-5'-P-CCNC: 23 U/L (ref 9–39)
BASOPHILS # BLD AUTO: 0.02 X10*3/UL (ref 0–0.1)
BASOPHILS NFR BLD AUTO: 0.5 %
BILIRUB SERPL-MCNC: 0.8 MG/DL (ref 0–1.2)
BUN SERPL-MCNC: 14 MG/DL (ref 6–23)
CALCIUM SERPL-MCNC: 8.9 MG/DL (ref 8.6–10.3)
CARDIAC TROPONIN I PNL SERPL HS: 9 NG/L (ref 0–20)
CHLORIDE SERPL-SCNC: 106 MMOL/L (ref 98–107)
CO2 SERPL-SCNC: 27 MMOL/L (ref 21–32)
CREAT SERPL-MCNC: 0.91 MG/DL (ref 0.5–1.3)
EOSINOPHIL # BLD AUTO: 0.06 X10*3/UL (ref 0–0.7)
EOSINOPHIL NFR BLD AUTO: 1.4 %
ERYTHROCYTE [DISTWIDTH] IN BLOOD BY AUTOMATED COUNT: 11.9 % (ref 11.5–14.5)
GFR SERPL CREATININE-BSD FRML MDRD: >90 ML/MIN/1.73M*2
GLUCOSE SERPL-MCNC: 91 MG/DL (ref 74–99)
HCT VFR BLD AUTO: 40.7 % (ref 41–52)
HGB BLD-MCNC: 13.9 G/DL (ref 13.5–17.5)
IMM GRANULOCYTES # BLD AUTO: 0 X10*3/UL (ref 0–0.7)
IMM GRANULOCYTES NFR BLD AUTO: 0 % (ref 0–0.9)
LIPASE SERPL-CCNC: 25 U/L (ref 9–82)
LYMPHOCYTES # BLD AUTO: 2.27 X10*3/UL (ref 1.2–4.8)
LYMPHOCYTES NFR BLD AUTO: 54.6 %
MAGNESIUM SERPL-MCNC: 2.01 MG/DL (ref 1.6–2.4)
MCH RBC QN AUTO: 32.3 PG (ref 26–34)
MCHC RBC AUTO-ENTMCNC: 34.2 G/DL (ref 32–36)
MCV RBC AUTO: 94 FL (ref 80–100)
MONOCYTES # BLD AUTO: 0.36 X10*3/UL (ref 0.1–1)
MONOCYTES NFR BLD AUTO: 8.7 %
NEUTROPHILS # BLD AUTO: 1.45 X10*3/UL (ref 1.2–7.7)
NEUTROPHILS NFR BLD AUTO: 34.8 %
NRBC BLD-RTO: 0 /100 WBCS (ref 0–0)
PLATELET # BLD AUTO: 209 X10*3/UL (ref 150–450)
POTASSIUM SERPL-SCNC: 4 MMOL/L (ref 3.5–5.3)
PROT SERPL-MCNC: 7.2 G/DL (ref 6.4–8.2)
RBC # BLD AUTO: 4.31 X10*6/UL (ref 4.5–5.9)
SODIUM SERPL-SCNC: 139 MMOL/L (ref 136–145)
WBC # BLD AUTO: 4.2 X10*3/UL (ref 4.4–11.3)

## 2023-11-29 PROCEDURE — 36415 COLL VENOUS BLD VENIPUNCTURE: CPT | Performed by: INTERNAL MEDICINE

## 2023-11-29 PROCEDURE — 93005 ELECTROCARDIOGRAM TRACING: CPT

## 2023-11-29 PROCEDURE — 83690 ASSAY OF LIPASE: CPT | Performed by: INTERNAL MEDICINE

## 2023-11-29 PROCEDURE — 96374 THER/PROPH/DIAG INJ IV PUSH: CPT

## 2023-11-29 PROCEDURE — 71275 CT ANGIOGRAPHY CHEST: CPT | Mod: 59,FR

## 2023-11-29 PROCEDURE — 83735 ASSAY OF MAGNESIUM: CPT | Performed by: INTERNAL MEDICINE

## 2023-11-29 PROCEDURE — 2500000005 HC RX 250 GENERAL PHARMACY W/O HCPCS: Performed by: INTERNAL MEDICINE

## 2023-11-29 PROCEDURE — 2500000004 HC RX 250 GENERAL PHARMACY W/ HCPCS (ALT 636 FOR OP/ED): Performed by: INTERNAL MEDICINE

## 2023-11-29 PROCEDURE — 80053 COMPREHEN METABOLIC PANEL: CPT | Performed by: INTERNAL MEDICINE

## 2023-11-29 PROCEDURE — 2550000001 HC RX 255 CONTRASTS: Performed by: INTERNAL MEDICINE

## 2023-11-29 PROCEDURE — 84484 ASSAY OF TROPONIN QUANT: CPT | Performed by: INTERNAL MEDICINE

## 2023-11-29 PROCEDURE — 85025 COMPLETE CBC W/AUTO DIFF WBC: CPT | Performed by: INTERNAL MEDICINE

## 2023-11-29 PROCEDURE — 71275 CT ANGIOGRAPHY CHEST: CPT | Mod: FOREIGN READ | Performed by: RADIOLOGY

## 2023-11-29 RX ORDER — FAMOTIDINE 10 MG/ML
20 INJECTION INTRAVENOUS ONCE
Status: COMPLETED | OUTPATIENT
Start: 2023-11-29 | End: 2023-11-29

## 2023-11-29 RX ORDER — SUCRALFATE 1 G/1
TABLET ORAL
Qty: 120 TABLET | Refills: 1 | Status: SHIPPED | OUTPATIENT
Start: 2023-11-29 | End: 2024-02-05 | Stop reason: ALTCHOICE

## 2023-11-29 RX ORDER — SUCRALFATE 1 G/10ML
1 SUSPENSION ORAL 3 TIMES DAILY
Qty: 300 ML | Refills: 0 | Status: SHIPPED | OUTPATIENT
Start: 2023-11-29 | End: 2023-11-29

## 2023-11-29 RX ADMIN — IOHEXOL 75 ML: 350 INJECTION, SOLUTION INTRAVENOUS at 01:42

## 2023-11-29 RX ADMIN — FAMOTIDINE 20 MG: 10 INJECTION, SOLUTION INTRAVENOUS at 00:32

## 2023-11-29 RX ADMIN — DIPHENHYDRAMINE HYDROCHLORIDE AND LIDOCAINE HYDROCHLORIDE AND ALUMINUM HYDROXIDE AND MAGNESIUM HYDRO 10 ML: KIT at 00:32

## 2023-11-29 ASSESSMENT — LIFESTYLE VARIABLES
EVER FELT BAD OR GUILTY ABOUT YOUR DRINKING: NO
HAVE YOU EVER FELT YOU SHOULD CUT DOWN ON YOUR DRINKING: NO
EVER HAD A DRINK FIRST THING IN THE MORNING TO STEADY YOUR NERVES TO GET RID OF A HANGOVER: NO
HAVE PEOPLE ANNOYED YOU BY CRITICIZING YOUR DRINKING: NO

## 2023-11-29 ASSESSMENT — PAIN SCALES - GENERAL
PAINLEVEL_OUTOF10: 4
PAINLEVEL_OUTOF10: 8

## 2023-11-29 ASSESSMENT — COLUMBIA-SUICIDE SEVERITY RATING SCALE - C-SSRS
1. SINCE LAST CONTACT, HAVE YOU WISHED YOU WERE DEAD OR WISHED YOU COULD GO TO SLEEP AND NOT WAKE UP?: NO
2. HAVE YOU ACTUALLY HAD ANY THOUGHTS OF KILLING YOURSELF?: NO
6. HAVE YOU EVER DONE ANYTHING, STARTED TO DO ANYTHING, OR PREPARED TO DO ANYTHING TO END YOUR LIFE?: NO

## 2023-11-29 ASSESSMENT — PAIN DESCRIPTION - DESCRIPTORS: DESCRIPTORS: RADIATING

## 2023-11-29 NOTE — PROGRESS NOTES
Patient was seen and evaluated in the ER today for chest pain.  He states chest pain has resolved.  Diagnosed with epigastric pain work-up negative.  Patient was prescribed Carafate suspension but states he does not want to take liquid.  Prescribed Carafate tablet.  Patient advised to follow-up with GI for further evaluation of epigastric symptoms.

## 2023-11-29 NOTE — ED PROVIDER NOTES
HPI   Chief Complaint   Patient presents with    Chest Pain       Patient presenting for evaluation of right-sided chest/abdominal pain.  Patient notes symptoms been present for multiple weeks.  Patient states has been evaluated in the emergency department 6 or 7 times.  Patient notes he has pain with inspiration.  Patient does have pain worse with eating certain foods.  Patient notes coffee will make the pain worse.  Patient denies vomiting or diarrhea but has been nauseated.  Patient denies shortness of breath.  Patient denies cough.  Denies fever or chills.  Patient notes he recently had a stress test and EGD for this.  Patient is currently taking an antacid but he does not know which one.  Patient denies intra-abdominal surgeries.                          [place] Coma Scale Score: 15                  Patient History   Past Medical History:   Diagnosis Date    Bilateral carpal tunnel syndrome     History of dysuria     History of sciatica     History of solitary pulmonary nodule     Intermittent chest pain     Leukopenia     Lumbosacral radiculitis     Lumbosacral strain     Radial styloid tenosynovitis (de quervain)     De Quervain's tenosynovitis    Tinea unguium     Onychomycosis of toenail     Past Surgical History:   Procedure Laterality Date    WISDOM TOOTH EXTRACTION       Family History   Problem Relation Name Age of Onset    Hypertension Mother      Diabetes Mother      No Known Problems Father      No Known Problems Sister      No Known Problems Brother      No Known Problems Daughter      No Known Problems Son      No Known Problems Mother's Sister      No Known Problems Mother's Brother      No Known Problems Father's Sister      Heart attack Father's Brother      Coronary artery disease Maternal Grandmother      Rheumatic fever Maternal Grandmother      No Known Problems Maternal Grandfather      No Known Problems Paternal Grandmother      No Known Problems Paternal Grandfather       Social History      Tobacco Use    Smoking status: Former     Types: Cigarettes     Quit date: 2018     Years since quittin.9     Passive exposure: Past    Smokeless tobacco: Never   Substance Use Topics    Alcohol use: Not on file    Drug use: Not on file       Physical Exam   ED Triage Vitals [23 2208]   Temp Heart Rate Resp BP   36.4 °C (97.5 °F) 60 18 152/70      SpO2 Temp src Heart Rate Source Patient Position   98 % -- -- --      BP Location FiO2 (%)     -- --       Physical Exam  Vitals and nursing note reviewed.   Constitutional:       Appearance: Normal appearance.   HENT:      Head: Atraumatic.      Right Ear: External ear normal.      Left Ear: External ear normal.      Nose: Nose normal.      Mouth/Throat:      Mouth: Mucous membranes are moist.      Pharynx: Oropharynx is clear.   Eyes:      Extraocular Movements: Extraocular movements intact.      Pupils: Pupils are equal, round, and reactive to light.   Cardiovascular:      Rate and Rhythm: Normal rate and regular rhythm.      Pulses: Normal pulses.   Pulmonary:      Effort: Pulmonary effort is normal.      Breath sounds: Normal breath sounds.   Abdominal:      Palpations: Abdomen is soft.      Tenderness: There is abdominal tenderness in the epigastric area.   Musculoskeletal:         General: No tenderness or signs of injury. Normal range of motion.      Cervical back: Normal range of motion and neck supple. No rigidity or tenderness.   Skin:     General: Skin is warm and dry.   Neurological:      General: No focal deficit present.      Mental Status: He is alert and oriented to person, place, and time. Mental status is at baseline.   Psychiatric:         Mood and Affect: Mood normal.         Behavior: Behavior normal.         ED Course & MDM   ED Course as of 23 0402      0352 Reassessed patient and pain improved.  Updated patient with findings on labs and CT.  Patient agrees with plan to follow-up with GI as outpatient return to ED  if having worsening symptoms or other concerns. [JA]      ED Course User Index  [JA] Bry Galvez DO         Diagnoses as of 11/29/23 0402   Chest pain, unspecified type   Epigastric pain       Medical Decision Making  Differential diagnosis: Acute gastritis, acute cholecystitis, pancreatitis, MI, PE, other    Trop negative. Pulses equal. O2 sat wnl on RA. EKG does not appear ischemic or demonstrate arrhythmia.   HEART score is low risk.  PERC score is negative.  Given patient has had recurrent pleuritic chest pain CT PE ordered and negative.  No known aortic pathology or findings to suggest aortic dissection.   No infectious source and imaging negative for pneumonia.   No anemia or electrolyte imbalance.     Chest pain is more specifically epigastric and right upper quadrant pain on exam.  Ultrasound negative.  LFTs within normal limits.  Patient has previously been told that he has gastritis and acid reflux.  Patient is taking pantoprazole.  Patient has GI follow-up.  Patient notes symptoms worsened after certain foods.  We will add short course of Carafate.  Patient agrees to follow-up with PCP/cardiology/gastroenterology as outpatient return to ED if having worsening symptoms or other concerns.    Patient agrees to follow up with PCP/Cardiology/cardiology    Patient agrees to return to the emergency department for further evaluation if having return of or worsening symptoms, chest pain, shortness of breath, numbness/tingling of the extremities, fever or other concerns.           Procedure  ECG 12 lead    Performed by: Bry Galvez DO  Authorized by: Bry Galvez DO    ECG interpreted by ED Physician in the absence of a cardiologist: yes    Previous ECG:     Previous ECG:  Compared to current    Similarity:  No change    Comparison ECG info:  11/19/2023 without significant change  Comments:      11/20/2023 22: 09 sinus bradycardia with first-degree AV block, rate 56, left axis deviation, left ventricular  hypertrophy, ST segments normal, T waves normal, abnormal EKG.  EKG interpreted by myself.       Bry Galvez,   11/29/23 2700

## 2023-12-07 ENCOUNTER — DOCUMENTATION (OUTPATIENT)
Dept: PHYSICAL THERAPY | Facility: CLINIC | Age: 48
End: 2023-12-07

## 2023-12-07 NOTE — PROGRESS NOTES
Physical Therapy                 Therapy Communication Note    Patient Name: Zen Boothe  MRN: [id number]  Today's Date: 12/7/2023     Discipline: Physical Therapy    Missed Time: No Show    Missed Visit Reason: NA    Communication: Called patient. Voicemail left informing patient of missed visit and reminder of next appointment time/date.

## 2023-12-22 ENCOUNTER — HOSPITAL ENCOUNTER (OUTPATIENT)
Dept: CARDIOLOGY | Facility: HOSPITAL | Age: 48
Discharge: HOME | End: 2023-12-22
Payer: MEDICAID

## 2023-12-22 PROCEDURE — 93005 ELECTROCARDIOGRAM TRACING: CPT

## 2023-12-29 LAB
ATRIAL RATE: 59 BPM
P AXIS: 99 DEGREES
P OFFSET: 152 MS
P ONSET: 107 MS
PR INTERVAL: 208 MS
Q ONSET: 211 MS
QRS COUNT: 10 BEATS
QRS DURATION: 92 MS
QT INTERVAL: 408 MS
QTC CALCULATION(BAZETT): 403 MS
QTC FREDERICIA: 405 MS
R AXIS: -33 DEGREES
T AXIS: 41 DEGREES
T OFFSET: 415 MS
VENTRICULAR RATE: 59 BPM

## 2024-01-03 LAB
ATRIAL RATE: 56 BPM
P AXIS: 56 DEGREES
PR INTERVAL: 209 MS
Q ONSET: 252 MS
QRS COUNT: 9 BEATS
QRS DURATION: 101 MS
QT INTERVAL: 422 MS
QTC CALCULATION(BAZETT): 408 MS
QTC FREDERICIA: 412 MS
R AXIS: -26 DEGREES
T AXIS: 21 DEGREES
T OFFSET: 463 MS
VENTRICULAR RATE: 56 BPM

## 2024-01-08 ENCOUNTER — OFFICE VISIT (OUTPATIENT)
Dept: PRIMARY CARE | Facility: HOSPITAL | Age: 49
End: 2024-01-08
Payer: MEDICAID

## 2024-01-08 ENCOUNTER — LAB (OUTPATIENT)
Dept: LAB | Facility: LAB | Age: 49
End: 2024-01-08
Payer: MEDICAID

## 2024-01-08 VITALS
RESPIRATION RATE: 18 BRPM | HEIGHT: 75 IN | BODY MASS INDEX: 31.58 KG/M2 | TEMPERATURE: 98 F | HEART RATE: 82 BPM | WEIGHT: 254 LBS | DIASTOLIC BLOOD PRESSURE: 89 MMHG | OXYGEN SATURATION: 98 % | SYSTOLIC BLOOD PRESSURE: 143 MMHG

## 2024-01-08 DIAGNOSIS — R35.0 FREQUENCY OF URINATION: ICD-10-CM

## 2024-01-08 DIAGNOSIS — R07.9 CHEST PAIN, UNSPECIFIED TYPE: Primary | ICD-10-CM

## 2024-01-08 LAB
APPEARANCE UR: CLEAR
BILIRUB UR STRIP.AUTO-MCNC: NEGATIVE MG/DL
COLOR UR: YELLOW
GLUCOSE UR STRIP.AUTO-MCNC: NEGATIVE MG/DL
KETONES UR STRIP.AUTO-MCNC: NEGATIVE MG/DL
LEUKOCYTE ESTERASE UR QL STRIP.AUTO: NEGATIVE
NITRITE UR QL STRIP.AUTO: NEGATIVE
PH UR STRIP.AUTO: 5 [PH]
PROT UR STRIP.AUTO-MCNC: NEGATIVE MG/DL
RBC # UR STRIP.AUTO: NEGATIVE /UL
SP GR UR STRIP.AUTO: 1.02
UROBILINOGEN UR STRIP.AUTO-MCNC: <2 MG/DL

## 2024-01-08 PROCEDURE — 87800 DETECT AGNT MULT DNA DIREC: CPT

## 2024-01-08 PROCEDURE — 81003 URINALYSIS AUTO W/O SCOPE: CPT

## 2024-01-08 PROCEDURE — 99214 OFFICE O/P EST MOD 30 MIN: CPT | Performed by: INTERNAL MEDICINE

## 2024-01-08 PROCEDURE — 1036F TOBACCO NON-USER: CPT | Performed by: INTERNAL MEDICINE

## 2024-01-08 SDOH — ECONOMIC STABILITY: FOOD INSECURITY: WITHIN THE PAST 12 MONTHS, THE FOOD YOU BOUGHT JUST DIDN'T LAST AND YOU DIDN'T HAVE MONEY TO GET MORE.: NEVER TRUE

## 2024-01-08 SDOH — ECONOMIC STABILITY: HOUSING INSECURITY
IN THE LAST 12 MONTHS, WAS THERE A TIME WHEN YOU DID NOT HAVE A STEADY PLACE TO SLEEP OR SLEPT IN A SHELTER (INCLUDING NOW)?: NO

## 2024-01-08 SDOH — ECONOMIC STABILITY: FOOD INSECURITY: WITHIN THE PAST 12 MONTHS, YOU WORRIED THAT YOUR FOOD WOULD RUN OUT BEFORE YOU GOT MONEY TO BUY MORE.: NEVER TRUE

## 2024-01-08 SDOH — ECONOMIC STABILITY: GENERAL
WHICH OF THE FOLLOWING DO YOU KNOW HOW TO USE AND HAVE ACCESS TO EVERY DAY? (CHOOSE ALL THAT APPLY): DESKTOP COMPUTER, LAPTOP COMPUTER, OR TABLET WITH BROADBAND INTERNET CONNECTION;SMARTPHONE WITH CELLULAR DATA PLAN

## 2024-01-08 SDOH — ECONOMIC STABILITY: INCOME INSECURITY: IN THE LAST 12 MONTHS, WAS THERE A TIME WHEN YOU WERE NOT ABLE TO PAY THE MORTGAGE OR RENT ON TIME?: NO

## 2024-01-08 SDOH — ECONOMIC STABILITY: TRANSPORTATION INSECURITY
IN THE PAST 12 MONTHS, HAS LACK OF TRANSPORTATION KEPT YOU FROM MEETINGS, WORK, OR FROM GETTING THINGS NEEDED FOR DAILY LIVING?: NO

## 2024-01-08 SDOH — HEALTH STABILITY: PHYSICAL HEALTH: ON AVERAGE, HOW MANY MINUTES DO YOU ENGAGE IN EXERCISE AT THIS LEVEL?: 60 MIN

## 2024-01-08 SDOH — ECONOMIC STABILITY: TRANSPORTATION INSECURITY
IN THE PAST 12 MONTHS, HAS THE LACK OF TRANSPORTATION KEPT YOU FROM MEDICAL APPOINTMENTS OR FROM GETTING MEDICATIONS?: NO

## 2024-01-08 SDOH — HEALTH STABILITY: PHYSICAL HEALTH: ON AVERAGE, HOW MANY DAYS PER WEEK DO YOU ENGAGE IN MODERATE TO STRENUOUS EXERCISE (LIKE A BRISK WALK)?: 6 DAYS

## 2024-01-08 ASSESSMENT — SOCIAL DETERMINANTS OF HEALTH (SDOH)
HOW OFTEN DO YOU GET TOGETHER WITH FRIENDS OR RELATIVES?: MORE THAN THREE TIMES A WEEK
WITHIN THE LAST YEAR, HAVE YOU BEEN HUMILIATED OR EMOTIONALLY ABUSED IN OTHER WAYS BY YOUR PARTNER OR EX-PARTNER?: NO
IN THE PAST 12 MONTHS, HAS THE ELECTRIC, GAS, OIL, OR WATER COMPANY THREATENED TO SHUT OFF SERVICE IN YOUR HOME?: NO
HOW OFTEN DO YOU ATTENT MEETINGS OF THE CLUB OR ORGANIZATION YOU BELONG TO?: PATIENT UNABLE TO ANSWER
WITHIN THE LAST YEAR, HAVE TO BEEN RAPED OR FORCED TO HAVE ANY KIND OF SEXUAL ACTIVITY BY YOUR PARTNER OR EX-PARTNER?: NO
WITHIN THE LAST YEAR, HAVE YOU BEEN AFRAID OF YOUR PARTNER OR EX-PARTNER?: NO
HOW OFTEN DO YOU ATTEND CHURCH OR RELIGIOUS SERVICES?: MORE THAN 4 TIMES PER YEAR
HOW HARD IS IT FOR YOU TO PAY FOR THE VERY BASICS LIKE FOOD, HOUSING, MEDICAL CARE, AND HEATING?: NOT HARD AT ALL
ARE YOU MARRIED, WIDOWED, DIVORCED, SEPARATED, NEVER MARRIED, OR LIVING WITH A PARTNER?: PATIENT DECLINED
IN A TYPICAL WEEK, HOW MANY TIMES DO YOU TALK ON THE PHONE WITH FAMILY, FRIENDS, OR NEIGHBORS?: MORE THAN THREE TIMES A WEEK
WITHIN THE LAST YEAR, HAVE YOU BEEN KICKED, HIT, SLAPPED, OR OTHERWISE PHYSICALLY HURT BY YOUR PARTNER OR EX-PARTNER?: NO
DO YOU BELONG TO ANY CLUBS OR ORGANIZATIONS SUCH AS CHURCH GROUPS UNIONS, FRATERNAL OR ATHLETIC GROUPS, OR SCHOOL GROUPS?: PATIENT UNABLE TO ANSWER

## 2024-01-08 ASSESSMENT — ANXIETY QUESTIONNAIRES
6. BECOMING EASILY ANNOYED OR IRRITABLE: NOT AT ALL
1. FEELING NERVOUS, ANXIOUS, OR ON EDGE: NOT AT ALL
GAD7 TOTAL SCORE: 0
3. WORRYING TOO MUCH ABOUT DIFFERENT THINGS: NOT AT ALL
IF YOU CHECKED OFF ANY PROBLEMS ON THIS QUESTIONNAIRE, HOW DIFFICULT HAVE THESE PROBLEMS MADE IT FOR YOU TO DO YOUR WORK, TAKE CARE OF THINGS AT HOME, OR GET ALONG WITH OTHER PEOPLE: NOT DIFFICULT AT ALL
2. NOT BEING ABLE TO STOP OR CONTROL WORRYING: NOT AT ALL
4. TROUBLE RELAXING: NOT AT ALL
7. FEELING AFRAID AS IF SOMETHING AWFUL MIGHT HAPPEN: NOT AT ALL
5. BEING SO RESTLESS THAT IT IS HARD TO SIT STILL: NOT AT ALL

## 2024-01-08 ASSESSMENT — ENCOUNTER SYMPTOMS
NAUSEA: 0
PALPITATIONS: 0
DIARRHEA: 0
LOSS OF SENSATION IN FEET: 0
SORE THROAT: 0
AGITATION: 0
OCCASIONAL FEELINGS OF UNSTEADINESS: 0
ACTIVITY CHANGE: 0
WEAKNESS: 0
DEPRESSION: 0
CHEST TIGHTNESS: 0
SINUS PAIN: 0
SHORTNESS OF BREATH: 0
MYALGIAS: 0
SINUS PRESSURE: 0
CHILLS: 0

## 2024-01-08 ASSESSMENT — LIFESTYLE VARIABLES
HOW OFTEN DO YOU HAVE SIX OR MORE DRINKS ON ONE OCCASION: NEVER
AUDIT-C TOTAL SCORE: 1
HOW MANY STANDARD DRINKS CONTAINING ALCOHOL DO YOU HAVE ON A TYPICAL DAY: 1 OR 2
HOW OFTEN DO YOU HAVE A DRINK CONTAINING ALCOHOL: MONTHLY OR LESS
SKIP TO QUESTIONS 9-10: 1

## 2024-01-08 ASSESSMENT — COLUMBIA-SUICIDE SEVERITY RATING SCALE - C-SSRS
2. HAVE YOU ACTUALLY HAD ANY THOUGHTS OF KILLING YOURSELF?: NO
1. IN THE PAST MONTH, HAVE YOU WISHED YOU WERE DEAD OR WISHED YOU COULD GO TO SLEEP AND NOT WAKE UP?: NO
6. HAVE YOU EVER DONE ANYTHING, STARTED TO DO ANYTHING, OR PREPARED TO DO ANYTHING TO END YOUR LIFE?: NO

## 2024-01-08 ASSESSMENT — PAIN SCALES - GENERAL: PAINLEVEL: 5

## 2024-01-08 NOTE — PROGRESS NOTES
"Subjective   Patient ID: Zen Boothe is a 48 y.o. male who presents for GERD (NEW PATIENT TO ). Feels good overall.  He works as a cook at [employer].  He gets recurrent chest pain that is central and they said it was GERD.  The medicine doesn't help. He also had an EGD and colonoscopy.  He also had a stress test that was normal. He has been to the ED repeatedly for this and similar symptoms.  No better with acid blockers at all.  He also noted some pain with urination at night at times.     GERD  He reports no chest pain, no nausea or no sore throat.        Review of Systems   Constitutional:  Negative for activity change and chills.   HENT:  Negative for congestion, sinus pressure, sinus pain and sore throat.    Respiratory:  Negative for chest tightness and shortness of breath.    Cardiovascular:  Negative for chest pain and palpitations.   Gastrointestinal:  Negative for diarrhea and nausea.   Musculoskeletal:  Negative for myalgias.   Neurological:  Negative for weakness.   Psychiatric/Behavioral:  Negative for agitation and behavioral problems.        Objective   /89 (BP Location: Left arm, Patient Position: Sitting, BP Cuff Size: Large adult)   Pulse 82   Temp 36.7 °C (98 °F) (Temporal)   Resp 18   Ht 1.905 m (6' 3\")   Wt 115 kg (254 lb)   SpO2 98%   BMI 31.75 kg/m²     Physical Exam  Constitutional:       Appearance: Normal appearance.   HENT:      Head: Normocephalic and atraumatic.      Nose: Nose normal.      Mouth/Throat:      Mouth: Mucous membranes are moist.   Eyes:      Extraocular Movements: Extraocular movements intact.      Pupils: Pupils are equal, round, and reactive to light.   Cardiovascular:      Rate and Rhythm: Normal rate and regular rhythm.   Pulmonary:      Effort: No respiratory distress.      Breath sounds: No wheezing.   Abdominal:      General: Bowel sounds are normal.      Palpations: Abdomen is soft.   Neurological:      General: No focal deficit " present.         Assessment/Plan   Problem List Items Addressed This Visit             ICD-10-CM    Chest pain - Primary R07.9    Frequency of urination R35.0    Relevant Orders    C. Trachomatis / N. Gonorrhoeae, Amplified Detection    Urinalysis with Reflex Microscopic     He should begin taking a vitamin D supplement to replace his losses. It doesn't seem to be GERD. He also has had a EGD and colonoscopy and they were normal.  He has had repeated ECGs with no acute changes. No bone pain noted on compression.  We will check his urine to see if there is evidence of stones and see him again in a month.

## 2024-01-09 LAB
C TRACH RRNA SPEC QL NAA+PROBE: NEGATIVE
N GONORRHOEA DNA SPEC QL PROBE+SIG AMP: NEGATIVE

## 2024-02-05 ENCOUNTER — OFFICE VISIT (OUTPATIENT)
Dept: PRIMARY CARE | Facility: HOSPITAL | Age: 49
End: 2024-02-05
Payer: MEDICAID

## 2024-02-05 VITALS
TEMPERATURE: 97.6 F | SYSTOLIC BLOOD PRESSURE: 128 MMHG | RESPIRATION RATE: 18 BRPM | HEART RATE: 53 BPM | WEIGHT: 262.4 LBS | BODY MASS INDEX: 32.63 KG/M2 | HEIGHT: 75 IN | DIASTOLIC BLOOD PRESSURE: 88 MMHG | OXYGEN SATURATION: 98 %

## 2024-02-05 DIAGNOSIS — B35.1 ONYCHOMYCOSIS OF TOENAIL: Primary | ICD-10-CM

## 2024-02-05 PROCEDURE — 1036F TOBACCO NON-USER: CPT | Performed by: INTERNAL MEDICINE

## 2024-02-05 PROCEDURE — 99214 OFFICE O/P EST MOD 30 MIN: CPT | Performed by: INTERNAL MEDICINE

## 2024-02-05 RX ORDER — METHOCARBAMOL 750 MG/1
750 TABLET, FILM COATED ORAL 3 TIMES DAILY
COMMUNITY
Start: 2016-12-12 | End: 2024-02-05 | Stop reason: ALTCHOICE

## 2024-02-05 RX ORDER — ACETAMINOPHEN 500 MG
2000 TABLET ORAL DAILY
COMMUNITY
Start: 2022-12-02

## 2024-02-05 RX ORDER — TERBINAFINE HYDROCHLORIDE 250 MG/1
250 TABLET ORAL
Qty: 90 TABLET | Refills: 1 | Status: SHIPPED | OUTPATIENT
Start: 2024-02-05 | End: 2024-05-31 | Stop reason: SDUPTHER

## 2024-02-05 RX ORDER — TERBINAFINE HYDROCHLORIDE 250 MG/1
250 TABLET ORAL
COMMUNITY
Start: 2020-02-04 | End: 2024-02-05 | Stop reason: SDUPTHER

## 2024-02-05 RX ORDER — MELOXICAM 15 MG/1
15 TABLET ORAL DAILY
COMMUNITY
Start: 2016-11-23 | End: 2024-02-05 | Stop reason: ALTCHOICE

## 2024-02-05 ASSESSMENT — ENCOUNTER SYMPTOMS
ACTIVITY CHANGE: 0
AGITATION: 0
NAUSEA: 0
CHEST TIGHTNESS: 0
CHILLS: 0
SORE THROAT: 0
SHORTNESS OF BREATH: 0
MYALGIAS: 0
PALPITATIONS: 0
SINUS PRESSURE: 0
DIARRHEA: 0
WEAKNESS: 0

## 2024-02-05 ASSESSMENT — PAIN SCALES - GENERAL: PAINLEVEL: 0-NO PAIN

## 2024-02-05 NOTE — PROGRESS NOTES
Primary care office Note- Dr. Nithin Sarmiento      Name: Zen Boothe, Age: 48 y.o., Gender: male, MRN: [id number]   Pharmacy:   [facility] #[id number] - [place], [place] - [address]  [address] [facility] [address]  Phone: [phone] Fax: [phone]    [id number] - [place], [place] - [address] AT [facility]  [address]  [facility] [address]  Phone: [phone] Fax: [phone]    [facility] [address]  [address]  [facility] [address]  [facility] [address]  Phone: [phone] Fax: [phone]     PCP: Nithin Sarmiento        Subjective:     Chief Complaint   Patient presents with    Follow-up       HPI:   Zen Boothe is a 48 y.o. male that presents for follow up of chest pain that has completely resolved.  Thankfully over the last week he has not felt the pain.  He does report fungal toe and fingernails.  He has not had any treatment for this in more than a few years.  ROS  Review of Systems   Constitutional:  Negative for activity change and chills.   HENT:  Negative for congestion, sinus pressure and sore throat.    Respiratory:  Negative for chest tightness and shortness of breath.    Cardiovascular:  Negative for chest pain and palpitations.   Gastrointestinal:  Negative for diarrhea and nausea.   Musculoskeletal:  Negative for myalgias.   Neurological:  Negative for weakness.   Psychiatric/Behavioral:  Negative for agitation and behavioral problems.         Medical History      PMH:    has a past medical history of Bilateral carpal tunnel syndrome, History of dysuria, History of sciatica, History of solitary pulmonary nodule, Intermittent chest pain, Leukopenia, Lumbosacral radiculitis, Lumbosacral strain, Radial styloid tenosynovitis (de quervain), and Tinea unguium.   Allergies:   Allergies   Allergen Reactions    Gabapentin Swelling     angioedema      Surgical Hx:   Past Surgical History:   Procedure Laterality Date     WISDOM TOOTH EXTRACTION        Social HX:   Social History     Tobacco Use    Smoking status: Former     Types: Cigarettes     Quit date: 2018     Years since quittin.0     Passive exposure: Past    Smokeless tobacco: Never   Vaping Use    Vaping Use: Never used   Substance Use Topics    Alcohol use: Yes     Alcohol/week: 3.0 standard drinks of alcohol     Types: 3 Shots of liquor per week    Drug use: Never        MEDS:   Current Outpatient Medications   Medication Instructions    cholecalciferol (VITAMIN D-3) 2,000 Units, oral, Daily    clindamycin (Cleocin T) 1 % gel Apply topically to head 1-2 times daily    ibuprofen 800 mg, oral, Every 8 hours PRN, TAKE WITH FOOD    terbinafine (LAMISIL) 250 mg, oral, Daily RT        Objective Data     Objective:   Visit Vitals  /88 (BP Location: Left arm, Patient Position: Sitting, BP Cuff Size: Large adult)   Pulse 53   Temp 36.4 °C (97.6 °F) (Temporal)   Resp 18        Physical Examination:   Physical Exam  Constitutional:       Appearance: Normal appearance.   HENT:      Head: Normocephalic and atraumatic.      Nose: Nose normal.      Mouth/Throat:      Mouth: Mucous membranes are moist.   Eyes:      Extraocular Movements: Extraocular movements intact.      Pupils: Pupils are equal, round, and reactive to light.   Cardiovascular:      Rate and Rhythm: Normal rate and regular rhythm.   Pulmonary:      Effort: No respiratory distress.      Breath sounds: No wheezing.   Abdominal:      General: Bowel sounds are normal.      Palpations: Abdomen is soft.   Neurological:      General: No focal deficit present.          Last Labs:   CBC:   WBC   Date Value Ref Range Status   2023 4.2 (L) 4.4 - 11.3 x10*3/uL Final   2023 5.3 4.4 - 11.3 x10*3/uL Final   2023 4.9 4.4 - 11.3 x10E9/L Final     Hemoglobin   Date Value Ref Range Status   2023 13.9 13.5 - 17.5 g/dL Final   2023 13.6 13.5 - 17.5 g/dL Final   2023 14.4 13.5 - 17.5 g/dL Final      MCV   Date Value Ref Range Status   11/29/2023 94 80 - 100 fL Final   11/19/2023 96 80 - 100 fL Final   06/06/2023 95 80 - 100 fL Final     Platelets   Date Value Ref Range Status   11/29/2023 209 150 - 450 x10*3/uL Final   11/19/2023 202 150 - 450 x10*3/uL Final   06/06/2023 215 150 - 450 x10E9/L Final      CMP:   Sodium   Date Value Ref Range Status   11/29/2023 139 136 - 145 mmol/L Final   11/19/2023 139 136 - 145 mmol/L Final   06/06/2023 138 136 - 145 mmol/L Final     Potassium   Date Value Ref Range Status   11/29/2023 4.0 3.5 - 5.3 mmol/L Final   11/19/2023 3.8 3.5 - 5.3 mmol/L Final   06/06/2023 4.0 3.5 - 5.3 mmol/L Final     Chloride   Date Value Ref Range Status   11/29/2023 106 98 - 107 mmol/L Final   11/19/2023 105 98 - 107 mmol/L Final   06/06/2023 103 98 - 107 mmol/L Final     Urea Nitrogen   Date Value Ref Range Status   11/29/2023 14 6 - 23 mg/dL Final   11/19/2023 17 6 - 23 mg/dL Final   06/06/2023 12 6 - 23 mg/dL Final     Creatinine   Date Value Ref Range Status   11/29/2023 0.91 0.50 - 1.30 mg/dL Final   11/19/2023 1.05 0.50 - 1.30 mg/dL Final   06/06/2023 1.06 0.50 - 1.30 mg/dL Final     eGFR   Date Value Ref Range Status   11/29/2023 >90 >60 mL/min/1.73m*2 Final     Comment:     Calculations of estimated GFR are performed using the 2021 CKD-EPI Study Refit equation without the race variable for the IDMS-Traceable creatinine methods.  https://jasn.asnjournals.org/content/early/2021/09/22/ASN.3899595928   11/19/2023 88 >60 mL/min/1.73m*2 Final     Comment:     Calculations of estimated GFR are performed using the 2021 CKD-EPI Study Refit equation without the race variable for the IDMS-Traceable creatinine methods.  https://jasn.asnjournals.org/content/early/2021/09/22/ASN.1348736659      A1c:   Hemoglobin A1C   Date Value Ref Range Status   06/06/2023 4.9 % Final     Comment:          Diagnosis of Diabetes-Adults   Non-Diabetic: < or = 5.6%   Increased risk for developing diabetes: 5.7-6.4%    Diagnostic of diabetes: > or = 6.5%  .       Monitoring of Diabetes                Age (y)     Therapeutic Goal (%)   Adults:          >18           <7.0   Pediatrics:    13-18           <7.5                   7-12           <8.0                   0- 6            7.5-8.5   American Diabetes Association. Diabetes Care 33(S1), Jan 2010.   01/07/2022 4.9 4.0 - 5.6 % Final        Other labs;   Vit D:   Vitamin D, 25-Hydroxy   Date Value Ref Range Status   06/06/2023 11 (A) ng/mL Final     Comment:     .  DEFICIENCY:         < 20   NG/ML  INSUFFICIENCY:      20-29  NG/ML  SUFFICIENCY:         NG/ML    THIS ASSAY ACCURATELY QUANTIFIES THE SUM OF  VITAMIN D3, 25-HYDROXY AND VIT D2,25-HYDROXY.   09/29/2022 13 (A) ng/mL Final     Comment:     .  DEFICIENCY:         < 20   NG/ML  INSUFFICIENCY:      20-29  NG/ML  SUFFICIENCY:         NG/ML    THIS ASSAY ACCURATELY QUANTIFIES THE SUM OF  VITAMIN D3, 25-HYDROXY AND VIT D2,25-HYDROXY.        TSH:  TSH   Date Value Ref Range Status   09/29/2022 1.35 0.44 - 3.98 mIU/L Final     Comment:      TSH testing is performed using different testing    methodology at [facility] than at other    [facility]. Direct result comparisons should    only be made within the same method.     12/12/2019 0.68 0.44 - 3.98 mIU/L Final     Comment:      TSH testing is performed using different testing    methodology at [facility] than at other    [facility]. Direct result comparisons should    only be made within the same method.  .   Patients receiving more than 5 mg/day of biotin may have interference   in test results.  A sample should be taken no sooner than eight hours   after  previous dose. Contact [phone] for additional information.     12/06/2018 0.47 0.44 - 3.98 mIU/L Final     Comment:      TSH testing is performed using different testing    methodology at [facility] than at other    [facility] hospitals. Direct result  comparisons should    only be made within the same method.  .   Patients receiving more than 5 mg/day of biotin may have interference   in test results.  A sample should be taken no sooner than eight hours   after  previous dose. Contact [phone] for additional information.          Assessment and Plan     Problem List Items Addressed This Visit       Onychomycosis of toenail - Primary    Relevant Medications    terbinafine (LamISIL) 250 mg tablet        Nithin Sarmiento MD

## 2024-03-25 ENCOUNTER — OFFICE VISIT (OUTPATIENT)
Dept: PRIMARY CARE | Facility: HOSPITAL | Age: 49
End: 2024-03-25
Payer: MEDICAID

## 2024-03-25 VITALS
HEIGHT: 75 IN | HEART RATE: 75 BPM | RESPIRATION RATE: 18 BRPM | DIASTOLIC BLOOD PRESSURE: 80 MMHG | WEIGHT: 258.4 LBS | TEMPERATURE: 98.1 F | BODY MASS INDEX: 32.13 KG/M2 | OXYGEN SATURATION: 96 % | SYSTOLIC BLOOD PRESSURE: 136 MMHG

## 2024-03-25 DIAGNOSIS — M79.642 LEFT HAND PAIN: ICD-10-CM

## 2024-03-25 DIAGNOSIS — M43.6 TORTICOLLIS, ACUTE: Primary | ICD-10-CM

## 2024-03-25 PROCEDURE — 99212 OFFICE O/P EST SF 10 MIN: CPT | Performed by: INTERNAL MEDICINE

## 2024-03-25 RX ORDER — METHYLPREDNISOLONE 4 MG/1
TABLET ORAL
Qty: 21 TABLET | Refills: 0 | Status: SHIPPED | OUTPATIENT
Start: 2024-03-25 | End: 2024-06-10 | Stop reason: ALTCHOICE

## 2024-03-25 ASSESSMENT — ENCOUNTER SYMPTOMS
NAUSEA: 0
SINUS PRESSURE: 0
AGITATION: 0
CHILLS: 0
SORE THROAT: 0
HEADACHES: 1
DIARRHEA: 0
PALPITATIONS: 0
WEAKNESS: 0
MYALGIAS: 0
SHORTNESS OF BREATH: 0
ACTIVITY CHANGE: 0
CHEST TIGHTNESS: 0

## 2024-03-25 ASSESSMENT — PAIN SCALES - GENERAL: PAINLEVEL: 0-NO PAIN

## 2024-03-25 NOTE — PROGRESS NOTES
Primary care office Note- Dr. Nithin Sarmiento      Name: Zen Boothe, Age: 48 y.o., Gender: male, MRN: [id number]   Pharmacy:   [facility] #[id number] - [place], [place] - [address]  [address] [facility] [address]  Phone: [phone] Fax: [phone]    [id number] - [place], [place] - [address] AT [facility]  [address]  [facility] [address]  Phone: [phone] Fax: [phone]    [facility] - [place], [place] - [address]  [address]  [facility] [address]  [facility] [address]  Phone: [phone] Fax: [phone]     PCP: Nithin Sarmiento        Subjective:     Chief Complaint   Patient presents with    Neck Pain    Headache       HPI:   Zen Boothe is a 48 y.o. male that presents for headache on a daily basis.  His cannot left his left hand and got an injection and now he cannot lift objects.  The headache are mild and at the top of his head.      ROS  Review of Systems   Constitutional:  Negative for activity change and chills.   HENT:  Negative for congestion, sinus pressure and sore throat.    Respiratory:  Negative for chest tightness and shortness of breath.    Cardiovascular:  Negative for chest pain and palpitations.   Gastrointestinal:  Negative for diarrhea and nausea.   Musculoskeletal:  Negative for myalgias.   Neurological:  Positive for headaches. Negative for weakness.   Psychiatric/Behavioral:  Negative for agitation and behavioral problems.         Medical History      PMH:    has a past medical history of Bilateral carpal tunnel syndrome, History of dysuria, History of sciatica, History of solitary pulmonary nodule, Intermittent chest pain, Leukopenia, Lumbosacral radiculitis, Lumbosacral strain, Radial styloid tenosynovitis (de quervain), and Tinea unguium.   Allergies:   Allergies   Allergen Reactions    Gabapentin Swelling     angioedema      Surgical Hx:   Past Surgical History:   Procedure Laterality Date    WISDOM TOOTH EXTRACTION         Social HX:   Social History     Tobacco Use    Smoking status: Former     Types: Cigarettes     Quit date: 2018     Years since quittin.2     Passive exposure: Past    Smokeless tobacco: Never   Vaping Use    Vaping Use: Never used   Substance Use Topics    Alcohol use: Yes     Alcohol/week: 3.0 standard drinks of alcohol     Types: 3 Shots of liquor per week    Drug use: Never        MEDS:   Current Outpatient Medications   Medication Instructions    cholecalciferol (VITAMIN D-3) 2,000 Units, oral, Daily    clindamycin (Cleocin T) 1 % gel Apply topically to head 1-2 times daily    ibuprofen 800 mg, oral, Every 8 hours PRN, TAKE WITH FOOD    methylPREDNISolone (Medrol Dospak) 4 mg tablets Take as directed on package.    terbinafine (LAMISIL) 250 mg, oral, Daily RT        Objective Data     Objective:   Visit Vitals  /80   Pulse 75   Temp 36.7 °C (98.1 °F)   Resp 18        Physical Examination:   Physical Exam  Constitutional:       Appearance: Normal appearance.   HENT:      Head: Normocephalic and atraumatic.      Nose: Nose normal.      Mouth/Throat:      Mouth: Mucous membranes are moist.   Eyes:      Extraocular Movements: Extraocular movements intact.      Pupils: Pupils are equal, round, and reactive to light.   Cardiovascular:      Rate and Rhythm: Normal rate and regular rhythm.   Pulmonary:      Effort: No respiratory distress.      Breath sounds: No wheezing.   Abdominal:      General: Bowel sounds are normal.      Palpations: Abdomen is soft.   Neurological:      General: No focal deficit present.        Last Labs:   CBC:   WBC   Date Value Ref Range Status   2023 4.2 (L) 4.4 - 11.3 x10*3/uL Final   2023 5.3 4.4 - 11.3 x10*3/uL Final   2023 4.9 4.4 - 11.3 x10E9/L Final     Hemoglobin   Date Value Ref Range Status   2023 13.9 13.5 - 17.5 g/dL Final   2023 13.6 13.5 - 17.5 g/dL Final   2023 14.4 13.5 - 17.5 g/dL Final     MCV   Date Value Ref Range Status    11/29/2023 94 80 - 100 fL Final   11/19/2023 96 80 - 100 fL Final   06/06/2023 95 80 - 100 fL Final     Platelets   Date Value Ref Range Status   11/29/2023 209 150 - 450 x10*3/uL Final   11/19/2023 202 150 - 450 x10*3/uL Final   06/06/2023 215 150 - 450 x10E9/L Final      CMP:   Sodium   Date Value Ref Range Status   11/29/2023 139 136 - 145 mmol/L Final   11/19/2023 139 136 - 145 mmol/L Final   06/06/2023 138 136 - 145 mmol/L Final     Potassium   Date Value Ref Range Status   11/29/2023 4.0 3.5 - 5.3 mmol/L Final   11/19/2023 3.8 3.5 - 5.3 mmol/L Final   06/06/2023 4.0 3.5 - 5.3 mmol/L Final     Chloride   Date Value Ref Range Status   11/29/2023 106 98 - 107 mmol/L Final   11/19/2023 105 98 - 107 mmol/L Final   06/06/2023 103 98 - 107 mmol/L Final     Urea Nitrogen   Date Value Ref Range Status   11/29/2023 14 6 - 23 mg/dL Final   11/19/2023 17 6 - 23 mg/dL Final   06/06/2023 12 6 - 23 mg/dL Final     Creatinine   Date Value Ref Range Status   11/29/2023 0.91 0.50 - 1.30 mg/dL Final   11/19/2023 1.05 0.50 - 1.30 mg/dL Final   06/06/2023 1.06 0.50 - 1.30 mg/dL Final     eGFR   Date Value Ref Range Status   11/29/2023 >90 >60 mL/min/1.73m*2 Final     Comment:     Calculations of estimated GFR are performed using the 2021 CKD-EPI Study Refit equation without the race variable for the IDMS-Traceable creatinine methods.  https://jasn.asnjournals.org/content/early/2021/09/22/ASN.4482855707   11/19/2023 88 >60 mL/min/1.73m*2 Final     Comment:     Calculations of estimated GFR are performed using the 2021 CKD-EPI Study Refit equation without the race variable for the IDMS-Traceable creatinine methods.  https://jasn.asnjournals.org/content/early/2021/09/22/ASN.0179937453      A1c:   Hemoglobin A1C   Date Value Ref Range Status   06/06/2023 4.9 % Final     Comment:          Diagnosis of Diabetes-Adults   Non-Diabetic: < or = 5.6%   Increased risk for developing diabetes: 5.7-6.4%   Diagnostic of diabetes: > or =  6.5%  .       Monitoring of Diabetes                Age (y)     Therapeutic Goal (%)   Adults:          >18           <7.0   Pediatrics:    13-18           <7.5                   7-12           <8.0                   0- 6            7.5-8.5   American Diabetes Association. Diabetes Care 33(S1), Jan 2010.   01/07/2022 4.9 4.0 - 5.6 % Final        Other labs;   Vit D:   Vitamin D, 25-Hydroxy   Date Value Ref Range Status   06/06/2023 11 (A) ng/mL Final     Comment:     .  DEFICIENCY:         < 20   NG/ML  INSUFFICIENCY:      20-29  NG/ML  SUFFICIENCY:         NG/ML    THIS ASSAY ACCURATELY QUANTIFIES THE SUM OF  VITAMIN D3, 25-HYDROXY AND VIT D2,25-HYDROXY.   09/29/2022 13 (A) ng/mL Final     Comment:     .  DEFICIENCY:         < 20   NG/ML  INSUFFICIENCY:      20-29  NG/ML  SUFFICIENCY:         NG/ML    THIS ASSAY ACCURATELY QUANTIFIES THE SUM OF  VITAMIN D3, 25-HYDROXY AND VIT D2,25-HYDROXY.        TSH:  TSH   Date Value Ref Range Status   09/29/2022 1.35 0.44 - 3.98 mIU/L Final     Comment:      TSH testing is performed using different testing    methodology at [facility] than at other    [facility]. Direct result comparisons should    only be made within the same method.     12/12/2019 0.68 0.44 - 3.98 mIU/L Final     Comment:      TSH testing is performed using different testing    methodology at [facility] than at other    [facility]. Direct result comparisons should    only be made within the same method.  .   Patients receiving more than 5 mg/day of biotin may have interference   in test results.  A sample should be taken no sooner than eight hours   after  previous dose. Contact [phone] for additional information.     12/06/2018 0.47 0.44 - 3.98 mIU/L Final     Comment:      TSH testing is performed using different testing    methodology at [facility] than at other    [facility] hospitals. Direct result comparisons should    only be made  within the same method.  .   Patients receiving more than 5 mg/day of biotin may have interference   in test results.  A sample should be taken no sooner than eight hours   after  previous dose. Contact [phone] for additional information.          Assessment and Plan     Problem List Items Addressed This Visit    None  Visit Diagnoses       Torticollis, acute    -  Primary    Relevant Medications    methylPREDNISolone (Medrol Dospak) 4 mg tablets    Left hand pain        Relevant Orders    Referral to Orthopaedic Surgery           We will prescribe a Medrol Dosepak as well as refer her to orthopedics to evaluate his hand.  Nithin Sarmeinto MD

## 2024-04-16 ENCOUNTER — HOSPITAL ENCOUNTER (EMERGENCY)
Facility: HOSPITAL | Age: 49
Discharge: HOME | End: 2024-04-16
Attending: EMERGENCY MEDICINE
Payer: MEDICAID

## 2024-04-16 VITALS
TEMPERATURE: 96.8 F | WEIGHT: 260 LBS | HEART RATE: 64 BPM | HEIGHT: 75 IN | SYSTOLIC BLOOD PRESSURE: 150 MMHG | OXYGEN SATURATION: 97 % | RESPIRATION RATE: 16 BRPM | DIASTOLIC BLOOD PRESSURE: 89 MMHG | BODY MASS INDEX: 32.33 KG/M2

## 2024-04-16 DIAGNOSIS — J02.9 SORE THROAT: Primary | ICD-10-CM

## 2024-04-16 PROCEDURE — 99281 EMR DPT VST MAYX REQ PHY/QHP: CPT

## 2024-04-17 NOTE — ED TRIAGE NOTES
Pt arrived to the Ed with c/o sore throat since this am. Pt states he feels like something is in his throat and he has a bitter taste.

## 2024-04-17 NOTE — ED PROVIDER NOTES
HPI   Chief Complaint   Patient presents with    Sore Throat     Pt arrived to the Ed with c/o sore throat since this am. Pt states he feels like something is in his throat and he has a bitter taste.        Patient presents for concern for a bump on the back of his tongue.  States he just noticed it last night.  He has been able to eat and drink without any regurgitation.  He states that there is a bitter taste in his mouth as well.  He has no pain in his throat.  States he just feels a bump with his finger.  No cough or congestion.  No shortness of breath.                          Masterson Coma Scale Score: 15                     Patient History   Past Medical History:   Diagnosis Date    Bilateral carpal tunnel syndrome     History of dysuria     History of sciatica     History of solitary pulmonary nodule     Intermittent chest pain     Leukopenia     Lumbosacral radiculitis     Lumbosacral strain     Radial styloid tenosynovitis (de quervain)     De Quervain's tenosynovitis    Tinea unguium     Onychomycosis of toenail     Past Surgical History:   Procedure Laterality Date    WISDOM TOOTH EXTRACTION       Family History   Problem Relation Name Age of Onset    Hypertension Mother      Diabetes Mother      No Known Problems Father      No Known Problems Sister      No Known Problems Brother      No Known Problems Daughter      No Known Problems Son      No Known Problems Mother's Sister      No Known Problems Mother's Brother      No Known Problems Father's Sister      Heart attack Father's Brother      Coronary artery disease Maternal Grandmother      Rheumatic fever Maternal Grandmother      No Known Problems Maternal Grandfather      No Known Problems Paternal Grandmother      No Known Problems Paternal Grandfather       Social History     Tobacco Use    Smoking status: Former     Current packs/day: 0.00     Types: Cigarettes     Quit date:      Years since quittin.2     Passive exposure: Past     Smokeless tobacco: Never   Vaping Use    Vaping status: Never Used   Substance Use Topics    Alcohol use: Yes     Alcohol/week: 3.0 standard drinks of alcohol     Types: 3 Shots of liquor per week    Drug use: Never       Physical Exam   ED Triage Vitals [04/16/24 2157]   Temperature Heart Rate Respirations BP   36 °C (96.8 °F) 60 16 (!) 153/94      Pulse Ox Temp Source Heart Rate Source Patient Position   97 % Temporal -- --      BP Location FiO2 (%)     -- --       Physical Exam  Vitals and nursing note reviewed.   Constitutional:       General: He is not in acute distress.     Appearance: He is well-developed.   HENT:      Head: Normocephalic and atraumatic.      Mouth/Throat:      Mouth: Mucous membranes are moist. No oral lesions.      Pharynx: No pharyngeal swelling, oropharyngeal exudate, posterior oropharyngeal erythema or uvula swelling.      Comments: Patient's posterior tongue is, his taste buds.  Eyes:      Conjunctiva/sclera: Conjunctivae normal.   Cardiovascular:      Rate and Rhythm: Normal rate and regular rhythm.      Heart sounds: No murmur heard.  Pulmonary:      Effort: Pulmonary effort is normal. No respiratory distress.      Breath sounds: Normal breath sounds.   Abdominal:      Palpations: Abdomen is soft.      Tenderness: There is no abdominal tenderness.   Musculoskeletal:         General: No swelling.      Cervical back: Neck supple.   Skin:     General: Skin is warm and dry.      Capillary Refill: Capillary refill takes less than 2 seconds.   Neurological:      Mental Status: He is alert.   Psychiatric:         Mood and Affect: Mood normal.         ED Course & MDM   Diagnoses as of 04/16/24 2207   Sore throat       Medical Decision Making  Differential diagnosis is oral lesion, papillitis, lingual edema, etc.    See no obvious acute abnormality.  I recommend he follow-up with ENT if his symptoms persist.  Prior medical records were reviewed.  Patient was  discharged.        Procedure  Procedures     Jesus Manuel Albright MD  04/16/24 5419

## 2024-04-24 ENCOUNTER — HOSPITAL ENCOUNTER (OUTPATIENT)
Dept: RADIOLOGY | Facility: CLINIC | Age: 49
End: 2024-04-24
Payer: MEDICAID

## 2024-04-24 DIAGNOSIS — M79.642 HAND PAIN, LEFT: Primary | ICD-10-CM

## 2024-04-25 ENCOUNTER — APPOINTMENT (OUTPATIENT)
Dept: RADIOLOGY | Facility: CLINIC | Age: 49
End: 2024-04-25
Payer: MEDICAID

## 2024-05-17 ENCOUNTER — HOSPITAL ENCOUNTER (EMERGENCY)
Facility: HOSPITAL | Age: 49
Discharge: HOME | End: 2024-05-17
Attending: STUDENT IN AN ORGANIZED HEALTH CARE EDUCATION/TRAINING PROGRAM
Payer: MEDICAID

## 2024-05-17 ENCOUNTER — APPOINTMENT (OUTPATIENT)
Dept: RADIOLOGY | Facility: HOSPITAL | Age: 49
End: 2024-05-17
Payer: MEDICAID

## 2024-05-17 ENCOUNTER — APPOINTMENT (OUTPATIENT)
Dept: CARDIOLOGY | Facility: HOSPITAL | Age: 49
End: 2024-05-17
Payer: MEDICAID

## 2024-05-17 VITALS
TEMPERATURE: 97.7 F | SYSTOLIC BLOOD PRESSURE: 164 MMHG | DIASTOLIC BLOOD PRESSURE: 91 MMHG | HEIGHT: 75 IN | HEART RATE: 68 BPM | OXYGEN SATURATION: 97 % | BODY MASS INDEX: 32.33 KG/M2 | WEIGHT: 260 LBS | RESPIRATION RATE: 18 BRPM

## 2024-05-17 DIAGNOSIS — R05.1 ACUTE COUGH: Primary | ICD-10-CM

## 2024-05-17 LAB
ALBUMIN SERPL BCP-MCNC: 3.9 G/DL (ref 3.4–5)
ALP SERPL-CCNC: 45 U/L (ref 33–120)
ALT SERPL W P-5'-P-CCNC: 30 U/L (ref 10–52)
ANION GAP SERPL CALC-SCNC: 11 MMOL/L (ref 10–20)
AST SERPL W P-5'-P-CCNC: 23 U/L (ref 9–39)
BASOPHILS # BLD AUTO: 0.02 X10*3/UL (ref 0–0.1)
BASOPHILS NFR BLD AUTO: 0.6 %
BILIRUB SERPL-MCNC: 0.7 MG/DL (ref 0–1.2)
BUN SERPL-MCNC: 11 MG/DL (ref 6–23)
CALCIUM SERPL-MCNC: 8.5 MG/DL (ref 8.6–10.3)
CHLORIDE SERPL-SCNC: 106 MMOL/L (ref 98–107)
CO2 SERPL-SCNC: 26 MMOL/L (ref 21–32)
CREAT SERPL-MCNC: 0.93 MG/DL (ref 0.5–1.3)
EGFRCR SERPLBLD CKD-EPI 2021: >90 ML/MIN/1.73M*2
EOSINOPHIL # BLD AUTO: 0.08 X10*3/UL (ref 0–0.7)
EOSINOPHIL NFR BLD AUTO: 2.3 %
ERYTHROCYTE [DISTWIDTH] IN BLOOD BY AUTOMATED COUNT: 12.5 % (ref 11.5–14.5)
FLUAV RNA RESP QL NAA+PROBE: NOT DETECTED
FLUBV RNA RESP QL NAA+PROBE: NOT DETECTED
GLUCOSE SERPL-MCNC: 85 MG/DL (ref 74–99)
HCT VFR BLD AUTO: 38.6 % (ref 41–52)
HGB BLD-MCNC: 12.7 G/DL (ref 13.5–17.5)
IMM GRANULOCYTES # BLD AUTO: 0 X10*3/UL (ref 0–0.7)
IMM GRANULOCYTES NFR BLD AUTO: 0 % (ref 0–0.9)
LYMPHOCYTES # BLD AUTO: 1.23 X10*3/UL (ref 1.2–4.8)
LYMPHOCYTES NFR BLD AUTO: 34.6 %
MCH RBC QN AUTO: 31.9 PG (ref 26–34)
MCHC RBC AUTO-ENTMCNC: 32.9 G/DL (ref 32–36)
MCV RBC AUTO: 97 FL (ref 80–100)
MONOCYTES # BLD AUTO: 0.66 X10*3/UL (ref 0.1–1)
MONOCYTES NFR BLD AUTO: 18.6 %
NEUTROPHILS # BLD AUTO: 1.56 X10*3/UL (ref 1.2–7.7)
NEUTROPHILS NFR BLD AUTO: 43.9 %
NRBC BLD-RTO: 0 /100 WBCS (ref 0–0)
PLATELET # BLD AUTO: 177 X10*3/UL (ref 150–450)
POTASSIUM SERPL-SCNC: 3.6 MMOL/L (ref 3.5–5.3)
PROT SERPL-MCNC: 6.8 G/DL (ref 6.4–8.2)
RBC # BLD AUTO: 3.98 X10*6/UL (ref 4.5–5.9)
RSV RNA RESP QL NAA+PROBE: NOT DETECTED
SARS-COV-2 RNA RESP QL NAA+PROBE: NOT DETECTED
SODIUM SERPL-SCNC: 139 MMOL/L (ref 136–145)
WBC # BLD AUTO: 3.6 X10*3/UL (ref 4.4–11.3)

## 2024-05-17 PROCEDURE — 36415 COLL VENOUS BLD VENIPUNCTURE: CPT | Performed by: STUDENT IN AN ORGANIZED HEALTH CARE EDUCATION/TRAINING PROGRAM

## 2024-05-17 PROCEDURE — 84075 ASSAY ALKALINE PHOSPHATASE: CPT | Performed by: STUDENT IN AN ORGANIZED HEALTH CARE EDUCATION/TRAINING PROGRAM

## 2024-05-17 PROCEDURE — 2500000004 HC RX 250 GENERAL PHARMACY W/ HCPCS (ALT 636 FOR OP/ED): Performed by: STUDENT IN AN ORGANIZED HEALTH CARE EDUCATION/TRAINING PROGRAM

## 2024-05-17 PROCEDURE — 87502 INFLUENZA DNA AMP PROBE: CPT | Performed by: STUDENT IN AN ORGANIZED HEALTH CARE EDUCATION/TRAINING PROGRAM

## 2024-05-17 PROCEDURE — 71046 X-RAY EXAM CHEST 2 VIEWS: CPT | Performed by: RADIOLOGY

## 2024-05-17 PROCEDURE — 93005 ELECTROCARDIOGRAM TRACING: CPT

## 2024-05-17 PROCEDURE — 71046 X-RAY EXAM CHEST 2 VIEWS: CPT

## 2024-05-17 PROCEDURE — 96374 THER/PROPH/DIAG INJ IV PUSH: CPT

## 2024-05-17 PROCEDURE — 99284 EMERGENCY DEPT VISIT MOD MDM: CPT | Mod: 25

## 2024-05-17 PROCEDURE — 85025 COMPLETE CBC W/AUTO DIFF WBC: CPT | Performed by: STUDENT IN AN ORGANIZED HEALTH CARE EDUCATION/TRAINING PROGRAM

## 2024-05-17 RX ORDER — KETOROLAC TROMETHAMINE 10 MG/1
10 TABLET, FILM COATED ORAL EVERY 8 HOURS PRN
Qty: 9 TABLET | Refills: 0 | Status: SHIPPED | OUTPATIENT
Start: 2024-05-17 | End: 2024-05-20

## 2024-05-17 RX ORDER — ACETAMINOPHEN 325 MG/1
650 TABLET ORAL EVERY 6 HOURS PRN
Qty: 30 TABLET | Refills: 0 | Status: SHIPPED | OUTPATIENT
Start: 2024-05-17

## 2024-05-17 RX ORDER — BENZONATATE 100 MG/1
100 CAPSULE ORAL 3 TIMES DAILY PRN
Qty: 30 CAPSULE | Refills: 0 | Status: SHIPPED | OUTPATIENT
Start: 2024-05-17 | End: 2024-06-10 | Stop reason: ALTCHOICE

## 2024-05-17 RX ORDER — KETOROLAC TROMETHAMINE 30 MG/ML
15 INJECTION, SOLUTION INTRAMUSCULAR; INTRAVENOUS ONCE
Status: COMPLETED | OUTPATIENT
Start: 2024-05-17 | End: 2024-05-17

## 2024-05-17 RX ADMIN — KETOROLAC TROMETHAMINE 15 MG: 30 INJECTION, SOLUTION INTRAMUSCULAR at 06:04

## 2024-05-17 ASSESSMENT — PAIN SCALES - GENERAL
PAINLEVEL_OUTOF10: 6
PAINLEVEL_OUTOF10: 8

## 2024-05-17 ASSESSMENT — COLUMBIA-SUICIDE SEVERITY RATING SCALE - C-SSRS
1. IN THE PAST MONTH, HAVE YOU WISHED YOU WERE DEAD OR WISHED YOU COULD GO TO SLEEP AND NOT WAKE UP?: NO
2. HAVE YOU ACTUALLY HAD ANY THOUGHTS OF KILLING YOURSELF?: NO
6. HAVE YOU EVER DONE ANYTHING, STARTED TO DO ANYTHING, OR PREPARED TO DO ANYTHING TO END YOUR LIFE?: NO

## 2024-05-17 ASSESSMENT — LIFESTYLE VARIABLES
TOTAL SCORE: 0
HAVE YOU EVER FELT YOU SHOULD CUT DOWN ON YOUR DRINKING: NO
HAVE PEOPLE ANNOYED YOU BY CRITICIZING YOUR DRINKING: NO
EVER FELT BAD OR GUILTY ABOUT YOUR DRINKING: NO
EVER HAD A DRINK FIRST THING IN THE MORNING TO STEADY YOUR NERVES TO GET RID OF A HANGOVER: NO

## 2024-05-17 ASSESSMENT — PAIN DESCRIPTION - PAIN TYPE: TYPE: ACUTE PAIN

## 2024-05-17 ASSESSMENT — PAIN - FUNCTIONAL ASSESSMENT: PAIN_FUNCTIONAL_ASSESSMENT: 0-10

## 2024-05-17 ASSESSMENT — PAIN DESCRIPTION - LOCATION: LOCATION: CHEST

## 2024-05-17 ASSESSMENT — PAIN DESCRIPTION - FREQUENCY: FREQUENCY: INTERMITTENT

## 2024-05-17 ASSESSMENT — PAIN DESCRIPTION - DESCRIPTORS: DESCRIPTORS: BURNING

## 2024-05-17 NOTE — ED PROVIDER NOTES
HPI   Chief Complaint   Patient presents with    Cough     47 y/o male complains of cough and burning in chest when he coughs. Symptoms have been ongoing for2 days/       HPI     48-year-old male with history of sciatica presenting with cough and burning chest discomfort while coughing.  Ongoing for 2 days.Patient states some discomfort with coughing.  Productive of yellowish phlegm.  No fevers.  No pain without coughing.  No exertional symptoms.  Denies any lower extremity swelling, edema.  States his niece had similar upper respiratory and cold-like symptoms a few days prior.               No data recorded                   Patient History   Past Medical History:   Diagnosis Date    Bilateral carpal tunnel syndrome     History of dysuria     History of sciatica     History of solitary pulmonary nodule     Intermittent chest pain     Leukopenia     Lumbosacral radiculitis     Lumbosacral strain     Radial styloid tenosynovitis (de quervain)     De Quervain's tenosynovitis    Tinea unguium     Onychomycosis of toenail     Past Surgical History:   Procedure Laterality Date    WISDOM TOOTH EXTRACTION       Family History   Problem Relation Name Age of Onset    Hypertension Mother      Diabetes Mother      No Known Problems Father      No Known Problems Sister      No Known Problems Brother      No Known Problems Daughter      No Known Problems Son      No Known Problems Mother's Sister      No Known Problems Mother's Brother      No Known Problems Father's Sister      Heart attack Father's Brother      Coronary artery disease Maternal Grandmother      Rheumatic fever Maternal Grandmother      No Known Problems Maternal Grandfather      No Known Problems Paternal Grandmother      No Known Problems Paternal Grandfather       Social History     Tobacco Use    Smoking status: Former     Current packs/day: 0.00     Types: Cigarettes     Quit date:      Years since quittin.3     Passive exposure: Past    Smokeless  tobacco: Never   Vaping Use    Vaping status: Never Used   Substance Use Topics    Alcohol use: Yes     Alcohol/week: 3.0 standard drinks of alcohol     Types: 3 Shots of liquor per week    Drug use: Never       Physical Exam   ED Triage Vitals [05/17/24 0535]   Temperature Heart Rate Respirations BP   36.5 °C (97.7 °F) 57 16 151/89      Pulse Ox Temp Source Heart Rate Source Patient Position   98 % Temporal Monitor Sitting      BP Location FiO2 (%)     Right arm --       Physical Exam  Vitals and nursing note reviewed.   Constitutional:       General: He is not in acute distress.     Appearance: He is well-developed.   HENT:      Head: Normocephalic and atraumatic.   Eyes:      Conjunctiva/sclera: Conjunctivae normal.   Cardiovascular:      Rate and Rhythm: Normal rate and regular rhythm.      Heart sounds: No murmur heard.  Pulmonary:      Effort: Pulmonary effort is normal. No respiratory distress.      Breath sounds: Normal breath sounds.   Abdominal:      Palpations: Abdomen is soft.      Tenderness: There is no abdominal tenderness.   Musculoskeletal:         General: No swelling.      Cervical back: Neck supple.   Skin:     General: Skin is warm and dry.      Capillary Refill: Capillary refill takes less than 2 seconds.   Neurological:      Mental Status: He is alert.   Psychiatric:         Mood and Affect: Mood normal.         ED Course & MDM   ED Course as of 05/18/24 0634   Fri May 17, 2024   0542  EKG as interpreted by myself: Rate 55, , , QTc 407 no STEMI or ischemic changes noted.  Impression sinus bradycardia, nonischemic. []   0610 CBC and Auto Differential(!)  Notable mild leukopenia, reviewed and otherwise reassuring []      ED Course User Index  [AH] Zahida Peters MD         Diagnoses as of 05/18/24 0634   Acute cough       Medical Decision Making  48-year-old male presenting as above on arrival here hemodynamically stable afebrile, nontachycardic normotensive.  EKG nonischemic.   Symptoms seem related to coughing and patient has some upper respiratory congestion appreciated on exam.  I low suspicion for ACS given his symptoms specifically related to coughing and recent exposure as well as productive cough.  EKG performed nonischemic.  Patient without chest pain only discomfort with coughing.  Less additional pulmonary embolism given lack of tachycardia, hypoxia, hypotension, lower extremity edema swelling or pain.  That in conjunction with patient's presenting constellation of symptoms I am primarily suspicious for a infectious etiology viral versus bacterial. Given patient's endorsed symptoms as well as appreciated congestion and family history of recent exposure concern for possible viral upper respiratory infection.  Lower suspicion for pneumonia given lack of fever, discoloration in patient's sputum.  Will get a two-view x-ray for evaluation, viral swabs as well for further delineation.  Patient will get some basic blood work given Toradol here for symptoms.  Pending results likely home-going given stability otherwise and reassuring vitals.  Patient workup here is reviewed reassuring.  Viral swabs negative.  X-ray negative.  Patient is updated regarding results and reassuringly no evidence of pneumonia.  He feels comfortable discharge at this time.  Discussed possible common cold signs and symptoms to monitor for and return precautions.  Patient Dors understanding all questions answered discharged home.     Procedure  Procedures     Zahida Peters MD  05/18/24 6532

## 2024-05-22 LAB
ATRIAL RATE: 55 BPM
P AXIS: 46 DEGREES
PR INTERVAL: 194 MS
Q ONSET: 252 MS
QRS COUNT: 9 BEATS
QRS DURATION: 100 MS
QT INTERVAL: 425 MS
QTC CALCULATION(BAZETT): 407 MS
QTC FREDERICIA: 412 MS
R AXIS: -23 DEGREES
T AXIS: 31 DEGREES
T OFFSET: 464 MS
VENTRICULAR RATE: 55 BPM

## 2024-05-31 DIAGNOSIS — B35.1 ONYCHOMYCOSIS OF TOENAIL: ICD-10-CM

## 2024-05-31 DIAGNOSIS — M79.18 MUSCULOSKELETAL PAIN: ICD-10-CM

## 2024-05-31 DIAGNOSIS — M94.0 COSTOCHONDRITIS: ICD-10-CM

## 2024-05-31 DIAGNOSIS — K21.9 GASTROESOPHAGEAL REFLUX DISEASE WITHOUT ESOPHAGITIS: Primary | ICD-10-CM

## 2024-05-31 RX ORDER — TERBINAFINE HYDROCHLORIDE 250 MG/1
250 TABLET ORAL
Qty: 90 TABLET | Refills: 0 | Status: SHIPPED | OUTPATIENT
Start: 2024-05-31 | End: 2024-08-29

## 2024-05-31 RX ORDER — PANTOPRAZOLE SODIUM 40 MG/1
40 TABLET, DELAYED RELEASE ORAL
Qty: 90 TABLET | Refills: 1 | Status: SHIPPED | OUTPATIENT
Start: 2024-05-31

## 2024-05-31 RX ORDER — PANTOPRAZOLE SODIUM 40 MG/1
40 TABLET, DELAYED RELEASE ORAL
COMMUNITY
End: 2024-05-31 | Stop reason: SDUPTHER

## 2024-05-31 RX ORDER — IBUPROFEN 800 MG/1
800 TABLET ORAL EVERY 8 HOURS PRN
Qty: 90 TABLET | Refills: 0 | Status: SHIPPED | OUTPATIENT
Start: 2024-05-31 | End: 2025-05-31

## 2024-06-10 ENCOUNTER — OFFICE VISIT (OUTPATIENT)
Dept: PRIMARY CARE | Facility: HOSPITAL | Age: 49
End: 2024-06-10
Payer: MEDICAID

## 2024-06-10 ENCOUNTER — LAB (OUTPATIENT)
Dept: LAB | Facility: LAB | Age: 49
End: 2024-06-10
Payer: MEDICAID

## 2024-06-10 VITALS
OXYGEN SATURATION: 98 % | WEIGHT: 256 LBS | HEIGHT: 75 IN | SYSTOLIC BLOOD PRESSURE: 134 MMHG | HEART RATE: 67 BPM | RESPIRATION RATE: 18 BRPM | BODY MASS INDEX: 31.83 KG/M2 | DIASTOLIC BLOOD PRESSURE: 88 MMHG | TEMPERATURE: 97.4 F

## 2024-06-10 DIAGNOSIS — Z12.5 PROSTATE CANCER SCREENING: ICD-10-CM

## 2024-06-10 DIAGNOSIS — E55.9 HYPOVITAMINOSIS D: ICD-10-CM

## 2024-06-10 DIAGNOSIS — Z83.3 FAMILY HISTORY OF DIABETES MELLITUS: ICD-10-CM

## 2024-06-10 DIAGNOSIS — R03.0 ELEVATED BLOOD PRESSURE READING: ICD-10-CM

## 2024-06-10 DIAGNOSIS — Z00.00 HEALTHCARE MAINTENANCE: ICD-10-CM

## 2024-06-10 DIAGNOSIS — Z00.00 HEALTHCARE MAINTENANCE: Primary | ICD-10-CM

## 2024-06-10 PROBLEM — H61.20 IMPACTED CERUMEN: Status: ACTIVE | Noted: 2024-06-10

## 2024-06-10 PROBLEM — K21.9 GASTROESOPHAGEAL REFLUX DISEASE: Status: ACTIVE | Noted: 2024-06-10

## 2024-06-10 PROBLEM — T78.1XXA ADVERSE FOOD REACTION: Status: ACTIVE | Noted: 2024-06-10

## 2024-06-10 PROBLEM — R09.A2 SENSATION OF LUMP IN THROAT: Status: ACTIVE | Noted: 2024-06-10

## 2024-06-10 PROBLEM — R91.8 MULTIPLE PULMONARY NODULES: Status: ACTIVE | Noted: 2024-06-10
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
## 2024-06-10 LAB
25(OH)D3 SERPL-MCNC: 29 NG/ML (ref 30–100)
CHOLEST SERPL-MCNC: 190 MG/DL (ref 0–199)
CHOLESTEROL/HDL RATIO: 3.2
EST. AVERAGE GLUCOSE BLD GHB EST-MCNC: 85 MG/DL
HBA1C MFR BLD: 4.6 %
HDLC SERPL-MCNC: 59.6 MG/DL
LDLC SERPL CALC-MCNC: 116 MG/DL
NON HDL CHOLESTEROL: 130 MG/DL (ref 0–149)
PSA SERPL-MCNC: 0.45 NG/ML
TRIGL SERPL-MCNC: 71 MG/DL (ref 0–149)
VLDL: 14 MG/DL (ref 0–40)

## 2024-06-10 PROCEDURE — 99396 PREV VISIT EST AGE 40-64: CPT | Performed by: INTERNAL MEDICINE

## 2024-06-10 PROCEDURE — 1036F TOBACCO NON-USER: CPT | Performed by: INTERNAL MEDICINE

## 2024-06-10 PROCEDURE — 99213 OFFICE O/P EST LOW 20 MIN: CPT | Performed by: INTERNAL MEDICINE

## 2024-06-10 PROCEDURE — 80061 LIPID PANEL: CPT

## 2024-06-10 PROCEDURE — 82306 VITAMIN D 25 HYDROXY: CPT

## 2024-06-10 PROCEDURE — 84153 ASSAY OF PSA TOTAL: CPT

## 2024-06-10 PROCEDURE — 83036 HEMOGLOBIN GLYCOSYLATED A1C: CPT

## 2024-06-10 PROCEDURE — 36415 COLL VENOUS BLD VENIPUNCTURE: CPT

## 2024-06-10 RX ORDER — DICLOFENAC SODIUM 10 MG/G
GEL TOPICAL
COMMUNITY
Start: 2024-03-13

## 2024-06-10 ASSESSMENT — ENCOUNTER SYMPTOMS
ACTIVITY CHANGE: 0
CHEST TIGHTNESS: 0
CONSTIPATION: 0
NAUSEA: 0
WEAKNESS: 0
PALPITATIONS: 0
BLOOD IN STOOL: 0
ABDOMINAL DISTENTION: 1
DIARRHEA: 0
OCCASIONAL FEELINGS OF UNSTEADINESS: 0
SHORTNESS OF BREATH: 0
CHILLS: 0
MYALGIAS: 0
ANAL BLEEDING: 0
LOSS OF SENSATION IN FEET: 0
AGITATION: 0
ABDOMINAL PAIN: 1
SINUS PRESSURE: 0
DEPRESSION: 0
SORE THROAT: 0

## 2024-06-10 ASSESSMENT — PATIENT HEALTH QUESTIONNAIRE - PHQ9
SUM OF ALL RESPONSES TO PHQ9 QUESTIONS 1 AND 2: 0
2. FEELING DOWN, DEPRESSED OR HOPELESS: NOT AT ALL
1. LITTLE INTEREST OR PLEASURE IN DOING THINGS: NOT AT ALL

## 2024-06-10 ASSESSMENT — PAIN SCALES - GENERAL: PAINLEVEL: 0-NO PAIN

## 2024-06-10 NOTE — PROGRESS NOTES
"Subjective   Patient ID: Zen Boothe is a 48 y.o. male who presents for Annual Exam.  Occasionally feels crampy pain on his lower abdomin. Sometimes his stomach gets hard.  The sensations are not necessarily associated with constipation or diarrhea.  He had a CAT scan and ultrasound due to similar symptoms and nothing was found.    HPI     Review of Systems   Constitutional:  Negative for activity change and chills.   HENT:  Negative for congestion, sinus pressure and sore throat.    Respiratory:  Negative for chest tightness and shortness of breath.    Cardiovascular:  Negative for chest pain and palpitations.   Gastrointestinal:  Positive for abdominal distention and abdominal pain. Negative for anal bleeding, blood in stool, constipation, diarrhea and nausea.   Musculoskeletal:  Negative for myalgias.   Neurological:  Negative for weakness.   Psychiatric/Behavioral:  Negative for agitation and behavioral problems.        Objective   /88   Pulse 67   Temp 36.3 °C (97.4 °F)   Resp 18   Ht 1.905 m (6' 3\")   Wt 116 kg (256 lb)   SpO2 98%   BMI 32.00 kg/m²     Physical Exam  Constitutional:       Appearance: Normal appearance.   HENT:      Head: Normocephalic and atraumatic.      Nose: Nose normal.      Mouth/Throat:      Mouth: Mucous membranes are moist.   Eyes:      Extraocular Movements: Extraocular movements intact.      Pupils: Pupils are equal, round, and reactive to light.   Cardiovascular:      Rate and Rhythm: Normal rate and regular rhythm.   Pulmonary:      Effort: No respiratory distress.      Breath sounds: No wheezing.   Abdominal:      General: Bowel sounds are normal.      Palpations: Abdomen is soft.   Neurological:      General: No focal deficit present.       Assessment/Plan   Problem List Items Addressed This Visit    None  Visit Diagnoses         Codes    Healthcare maintenance    -  Primary Z00.00    Prostate cancer screening     Z12.5    Hypovitaminosis D     E55.9    Family " history of diabetes mellitus     Z83.3        We discussed his frequent ED visits and strategies he can take at home to assess his heart status.  He will get an [employer] home BP measuring device.  We talked about measures he can take at home that can reassure him that there is nothing serious going on.  I explained the dangers of too much, or too many, visits to the emergency department.

## 2024-06-24 ENCOUNTER — APPOINTMENT (OUTPATIENT)
Dept: PRIMARY CARE | Facility: HOSPITAL | Age: 49
End: 2024-06-24
Payer: MEDICAID

## 2024-06-28 ENCOUNTER — TELEPHONE (OUTPATIENT)
Dept: PRIMARY CARE | Facility: CLINIC | Age: 49
End: 2024-06-28
Payer: MEDICAID

## 2024-06-28 DIAGNOSIS — H53.9 VISION CHANGES: Primary | ICD-10-CM

## 2024-08-27 ENCOUNTER — APPOINTMENT (OUTPATIENT)
Dept: PRIMARY CARE | Facility: CLINIC | Age: 49
End: 2024-08-27
Payer: MEDICAID

## 2024-09-03 ENCOUNTER — APPOINTMENT (OUTPATIENT)
Dept: PRIMARY CARE | Facility: CLINIC | Age: 49
End: 2024-09-03
Payer: MEDICAID

## 2024-09-03 VITALS
SYSTOLIC BLOOD PRESSURE: 125 MMHG | HEART RATE: 62 BPM | BODY MASS INDEX: 32.35 KG/M2 | OXYGEN SATURATION: 94 % | WEIGHT: 258.8 LBS | DIASTOLIC BLOOD PRESSURE: 82 MMHG

## 2024-09-03 DIAGNOSIS — M94.0 COSTOCHONDRITIS: Primary | ICD-10-CM

## 2024-09-03 DIAGNOSIS — M79.18 MUSCULOSKELETAL PAIN: ICD-10-CM

## 2024-09-03 PROCEDURE — 99213 OFFICE O/P EST LOW 20 MIN: CPT | Performed by: INTERNAL MEDICINE

## 2024-09-03 PROCEDURE — 1036F TOBACCO NON-USER: CPT | Performed by: INTERNAL MEDICINE

## 2024-09-03 RX ORDER — IBUPROFEN 800 MG/1
800 TABLET ORAL EVERY 8 HOURS PRN
Qty: 90 TABLET | Refills: 3 | Status: SHIPPED | OUTPATIENT
Start: 2024-09-03 | End: 2025-09-03

## 2024-09-03 ASSESSMENT — ENCOUNTER SYMPTOMS
NAUSEA: 0
SHORTNESS OF BREATH: 0
PALPITATIONS: 0
SORE THROAT: 0
ACTIVITY CHANGE: 0
AGITATION: 0
CHEST TIGHTNESS: 0
CHILLS: 0
DIARRHEA: 0
SINUS PRESSURE: 0
WEAKNESS: 0
MYALGIAS: 0

## 2024-09-03 NOTE — PROGRESS NOTES
Subjective   Patient ID: Zen Boothe is a 49 y.o. male who presents for Follow-up.  Feeling right sided chest pain and his stomach is full. Worse with deep breathing. Ibuprofen helps consistently.     HPI     Review of Systems   Constitutional:  Negative for activity change and chills.   HENT:  Negative for congestion, sinus pressure and sore throat.    Respiratory:  Negative for chest tightness and shortness of breath.    Cardiovascular:  Positive for chest pain. Negative for palpitations.   Gastrointestinal:  Negative for diarrhea and nausea.   Musculoskeletal:  Negative for myalgias.   Neurological:  Negative for weakness.   Psychiatric/Behavioral:  Negative for agitation and behavioral problems.        Objective   /82 (BP Location: Left arm, Patient Position: Sitting)   Pulse 62   Wt 117 kg (258 lb 12.8 oz)   SpO2 94%   BMI 32.35 kg/m²     Physical Exam  Constitutional:       Appearance: Normal appearance.   HENT:      Head: Normocephalic and atraumatic.      Nose: Nose normal.      Mouth/Throat:      Mouth: Mucous membranes are moist.   Eyes:      Extraocular Movements: Extraocular movements intact.      Pupils: Pupils are equal, round, and reactive to light.   Cardiovascular:      Rate and Rhythm: Normal rate and regular rhythm.   Pulmonary:      Effort: No respiratory distress.      Breath sounds: No wheezing.   Abdominal:      General: Bowel sounds are normal.      Palpations: Abdomen is soft.   Neurological:      General: No focal deficit present.         Assessment/Plan   Problem List Items Addressed This Visit             ICD-10-CM    Costochondritis - Primary M94.0    Relevant Medications    ibuprofen 800 mg tablet     Other Visit Diagnoses         Codes    Musculoskeletal pain     M79.18    Relevant Medications    ibuprofen 800 mg tablet          I reassured the patient that the symptoms are not related to his heart and he has had a complete workup recently.  The next time he feels this he  should take an ibuprofen at that down and rest.  He works a very strenuous job in maintenance.

## 2024-09-09 ENCOUNTER — APPOINTMENT (OUTPATIENT)
Dept: OPHTHALMOLOGY | Facility: CLINIC | Age: 49
End: 2024-09-09
Payer: MEDICAID

## 2024-09-09 DIAGNOSIS — H52.4 MYOPIA OF BOTH EYES WITH ASTIGMATISM AND PRESBYOPIA: Primary | ICD-10-CM

## 2024-09-09 DIAGNOSIS — H52.203 MYOPIA OF BOTH EYES WITH ASTIGMATISM AND PRESBYOPIA: Primary | ICD-10-CM

## 2024-09-09 DIAGNOSIS — B95.8 MARGINAL KERATITIS OF LEFT EYE DUE TO STAPHYLOCOCCUS TOXIN: ICD-10-CM

## 2024-09-09 DIAGNOSIS — H16.042 MARGINAL KERATITIS OF LEFT EYE DUE TO STAPHYLOCOCCUS TOXIN: ICD-10-CM

## 2024-09-09 DIAGNOSIS — H53.9 VISION CHANGES: ICD-10-CM

## 2024-09-09 DIAGNOSIS — H52.13 MYOPIA OF BOTH EYES WITH ASTIGMATISM AND PRESBYOPIA: Primary | ICD-10-CM

## 2024-09-09 DIAGNOSIS — H47.093 ASYMMETRY OF OPTIC NERVE OF BOTH EYES: ICD-10-CM

## 2024-09-09 ASSESSMENT — CUP TO DISC RATIO
OS_RATIO: .5
OD_RATIO: .4

## 2024-09-09 ASSESSMENT — SLIT LAMP EXAM - LIDS
COMMENTS: GOOD POSITION, 2+ MGD
COMMENTS: GOOD POSITION, 2+ MGD

## 2024-09-09 ASSESSMENT — CONF VISUAL FIELD
OS_SUPERIOR_TEMPORAL_RESTRICTION: 0
OD_SUPERIOR_TEMPORAL_RESTRICTION: 0
METHOD: COUNTING FINGERS
OD_SUPERIOR_NASAL_RESTRICTION: 0
OD_INFERIOR_TEMPORAL_RESTRICTION: 0
OS_SUPERIOR_NASAL_RESTRICTION: 0
OS_NORMAL: 1
OD_NORMAL: 1
OS_INFERIOR_NASAL_RESTRICTION: 0
OD_INFERIOR_NASAL_RESTRICTION: 0
OS_INFERIOR_TEMPORAL_RESTRICTION: 0

## 2024-09-09 ASSESSMENT — VISUAL ACUITY
OD_SC: 20/30+2
OS_SC: 20/25
OS_SC: 20/30+2
METHOD: SNELLEN - LINEAR
OD_SC: 20/40

## 2024-09-09 ASSESSMENT — REFRACTION_MANIFEST
OD_SPHERE: -0.25
OD_AXIS: 073
OD_AXIS: 070
OS_CYLINDER: -1.00
OD_CYLINDER: -1.00
OS_AXIS: 087
OS_AXIS: 087
OD_SPHERE: -0.25
OD_ADD: +1.25
OD_CYLINDER: -1.00
OS_SPHERE: -0.25
OS_CYLINDER: -1.25
OS_SPHERE: +0.00
OS_ADD: +1.25
METHOD_AUTOREFRACTION: 1

## 2024-09-09 ASSESSMENT — ENCOUNTER SYMPTOMS
HEMATOLOGIC/LYMPHATIC NEGATIVE: 0
RESPIRATORY NEGATIVE: 0
NEUROLOGICAL NEGATIVE: 0
ALLERGIC/IMMUNOLOGIC NEGATIVE: 0
CARDIOVASCULAR NEGATIVE: 0
MUSCULOSKELETAL NEGATIVE: 0
ENDOCRINE NEGATIVE: 0
EYES NEGATIVE: 1
CONSTITUTIONAL NEGATIVE: 0
PSYCHIATRIC NEGATIVE: 0
GASTROINTESTINAL NEGATIVE: 0

## 2024-09-09 ASSESSMENT — TONOMETRY
OS_IOP_MMHG: 14
IOP_METHOD: GOLDMANN APPLANATION
OD_IOP_MMHG: 14

## 2024-09-09 ASSESSMENT — EXTERNAL EXAM - RIGHT EYE: OD_EXAM: NORMAL

## 2024-09-09 ASSESSMENT — EXTERNAL EXAM - LEFT EYE: OS_EXAM: NORMAL

## 2024-09-09 NOTE — PROGRESS NOTES
Assessment/Plan   Diagnoses and all orders for this visit:  Myopia of both eyes with astigmatism and presbyopia  New spec rx released today per patient request.  Monitor 1 year or sooner prn. Refraction billed today.     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 RTC in 3-4 weeks for follow up or sooner if any new signs or symptoms present. 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 Today rim tissue appears healthy. Monitor.

## 2024-10-07 ENCOUNTER — APPOINTMENT (OUTPATIENT)
Dept: OPHTHALMOLOGY | Facility: CLINIC | Age: 49
End: 2024-10-07
Payer: MEDICAID

## 2024-10-12 ENCOUNTER — HOSPITAL ENCOUNTER (EMERGENCY)
Facility: HOSPITAL | Age: 49
Discharge: HOME | End: 2024-10-12
Payer: MEDICAID

## 2024-10-12 VITALS
RESPIRATION RATE: 16 BRPM | HEART RATE: 60 BPM | HEIGHT: 75 IN | SYSTOLIC BLOOD PRESSURE: 137 MMHG | OXYGEN SATURATION: 97 % | DIASTOLIC BLOOD PRESSURE: 82 MMHG | TEMPERATURE: 98.4 F | WEIGHT: 260 LBS | BODY MASS INDEX: 32.33 KG/M2

## 2024-10-12 DIAGNOSIS — J02.9 VIRAL PHARYNGITIS: Primary | ICD-10-CM

## 2024-10-12 LAB
FLUAV RNA RESP QL NAA+PROBE: NOT DETECTED
FLUBV RNA RESP QL NAA+PROBE: NOT DETECTED
S PYO DNA THROAT QL NAA+PROBE: NOT DETECTED
SARS-COV-2 RNA RESP QL NAA+PROBE: NOT DETECTED

## 2024-10-12 PROCEDURE — 87651 STREP A DNA AMP PROBE: CPT | Performed by: NURSE PRACTITIONER

## 2024-10-12 PROCEDURE — 2500000004 HC RX 250 GENERAL PHARMACY W/ HCPCS (ALT 636 FOR OP/ED): Performed by: NURSE PRACTITIONER

## 2024-10-12 PROCEDURE — 87636 SARSCOV2 & INF A&B AMP PRB: CPT | Performed by: NURSE PRACTITIONER

## 2024-10-12 PROCEDURE — 99283 EMERGENCY DEPT VISIT LOW MDM: CPT

## 2024-10-12 PROCEDURE — 2500000001 HC RX 250 WO HCPCS SELF ADMINISTERED DRUGS (ALT 637 FOR MEDICARE OP): Performed by: NURSE PRACTITIONER

## 2024-10-12 RX ORDER — PREDNISONE 20 MG/1
40 TABLET ORAL DAILY
Qty: 10 TABLET | Refills: 0 | Status: SHIPPED | OUTPATIENT
Start: 2024-10-12 | End: 2024-10-17

## 2024-10-12 RX ORDER — IBUPROFEN 600 MG/1
600 TABLET ORAL ONCE
Status: COMPLETED | OUTPATIENT
Start: 2024-10-12 | End: 2024-10-12

## 2024-10-12 RX ORDER — PREDNISONE 20 MG/1
60 TABLET ORAL ONCE
Status: COMPLETED | OUTPATIENT
Start: 2024-10-12 | End: 2024-10-12

## 2024-10-12 RX ADMIN — IBUPROFEN 600 MG: 600 TABLET, FILM COATED ORAL at 08:39

## 2024-10-12 RX ADMIN — PREDNISONE 60 MG: 20 TABLET ORAL at 08:39

## 2024-10-12 ASSESSMENT — COLUMBIA-SUICIDE SEVERITY RATING SCALE - C-SSRS
2. HAVE YOU ACTUALLY HAD ANY THOUGHTS OF KILLING YOURSELF?: NO
6. HAVE YOU EVER DONE ANYTHING, STARTED TO DO ANYTHING, OR PREPARED TO DO ANYTHING TO END YOUR LIFE?: NO
1. IN THE PAST MONTH, HAVE YOU WISHED YOU WERE DEAD OR WISHED YOU COULD GO TO SLEEP AND NOT WAKE UP?: NO

## 2024-10-12 ASSESSMENT — LIFESTYLE VARIABLES
EVER HAD A DRINK FIRST THING IN THE MORNING TO STEADY YOUR NERVES TO GET RID OF A HANGOVER: NO
TOTAL SCORE: 0
EVER FELT BAD OR GUILTY ABOUT YOUR DRINKING: NO
HAVE YOU EVER FELT YOU SHOULD CUT DOWN ON YOUR DRINKING: NO
HAVE PEOPLE ANNOYED YOU BY CRITICIZING YOUR DRINKING: NO

## 2024-10-12 ASSESSMENT — PAIN DESCRIPTION - PAIN TYPE: TYPE: ACUTE PAIN

## 2024-10-12 ASSESSMENT — PAIN SCALES - GENERAL: PAINLEVEL_OUTOF10: 7

## 2024-10-12 ASSESSMENT — PAIN - FUNCTIONAL ASSESSMENT: PAIN_FUNCTIONAL_ASSESSMENT: 0-10

## 2024-10-12 ASSESSMENT — PAIN DESCRIPTION - LOCATION: LOCATION: THROAT

## 2024-10-12 NOTE — DISCHARGE INSTRUCTIONS
COVID, influenza and strep are all negative.  You have a viral infection causing your sore throat.  You showed improvement with Motrin and prednisone.  You are discharged home.  Continue Motrin 600 mg every 8 hours or Tylenol if preferred.  Increase fluids.  Prednisone was sent to your pharmacy.  Continue this daily x 5 days starting tomorrow.  This will help with sore throat.  Follow-up with PCP in the next 2 to 3 days.

## 2024-10-12 NOTE — ED PROVIDER NOTES
Limitations to History: None     HPI:      Zen Boothe is a 49 y.o. with no significant past medical history presenting to ED today from home by himself for evaluation of a sore throat.  Patient has had cold symptoms and a sore throat for the last 2 days.  Today the patient noticed difficulty swallowing so he came to the ER for further evaluation.  No medication taken prior to arrival for the symptoms.  No sick contacts.  Denies fever/chills, chest pain, shortness of breath, nausea/vomiting, abdominal pain, urinary symptoms, change in bowel habits or any other complaints.  Occasional EtOH, no smoking or drug use.  PCP is Dr. Sarmiento.    Additional History Obtained from: Triage/nursing notes reviewed.    ------------------------------------------------------------------------------------------------------------------------------------------    VS: As documented in the triage note and EMR flowsheet from this visit were reviewed.    Physical Exam:  Gen: 49-year-old -American male, awake and alert, appears tired but nontoxic.  Oriented x 3.  Well-nourished and hydrated.  Head/Neck: NCAT, neck w/ FROM.  Bilateral anterior cervical lymphadenopathy.  Eyes: EOMI, PERRL, anicteric sclerae, noninjected conjunctivae  Ears: TMs clear b/l without sign of infection  Nose: Nares patent w/o rhinorrhea  Mouth:  MMM, no OP lesions noted.  Posterior pharynx is erythematous, tonsils atrophied.  Uvula midline.  Able to handle secretions.  Heart: RRR no MRG  Lungs: CTA b/l no RRW, no increased work of breathing  Abdomen: soft, NT, ND, no HSM, no palpable masses  Musculoskeletal: CHANDANA x 4.  MSPs intact.  Skin intact.  No deformities.  Neurologic: Alert, symmetrical facies, phonates clearly, moves all extremities equally, responsive to touch, ambulates normally   Skin: Pink, warm and dry.  No erythema, edema or ecchymosis.  No rashes noted  Psychological: calm, no  SI/HI      ------------------------------------------------------------------------------------------------------------------------------------------    Medical Decision Making: Otherwise healthy 49-year-old male, is evaluated at the bedside for cold symptoms including a sore throat x 2 days.  Sore throat worse today.  Vital signs within normal limits.  Afebrile.  Posterior oropharynx is erythematous, tonsils are atrophied and the patient does have anterior cervical lymphadenopathy.  Differential includes but is not limited to strep throat, influenza and COVID-19.  Given oral fluids.  Given p.o. Motrin and started on a p.o. steroid burst.  Swabbed for strep, influenza and COVID.      ED Course as of 10/12/24 0953   Sat Oct 12, 2024   0948 COVID, influenza and strep are all negative.  The patient likely has a viral illness that is causing his sore throat.  He showed improvement after oral prednisone/Motrin with oral fluids.  Repeat vital signs within normal limits.  I feel comfortable discharging the patient home.  Treatment will be symptomatic.  Motrin/Tylenol.  Steroid burst will continue for 5 days.  Sent to pharmacy.  Increase fluids.  Rest.  Follow-up with PCP in the next 2 to 3 days.  Return precautions and red flags discussed.  All questions were answered.  Patient verbalizes understanding of diagnosis and treatment plan.  Condition stable for discharge. [SB]      ED Course User Index  [SB] GUY Stern         Diagnoses as of 10/12/24 0953   Viral pharyngitis       EKG interpreted by myself (ED attending physician): Not ordered    Chronic Medical Conditions Significantly Affecting Care: None    External Records Reviewed: I reviewed recent and relevant outside records including: None    Discussion of Management with Other Providers: None    I discussed the patient/results with: None       GUY Stern  10/12/24 0953

## 2024-10-12 NOTE — Clinical Note
Zen Boothe was seen and treated in our emergency department on 10/12/2024.  He may return to work on 10/12/2024.  Was in the ER earlier today, cleared to return to work.     If you have any questions or concerns, please don't hesitate to call.      Marline Donnelly, APRN-CNP

## 2024-10-21 ENCOUNTER — OFFICE VISIT (OUTPATIENT)
Dept: PRIMARY CARE | Facility: HOSPITAL | Age: 49
End: 2024-10-21
Payer: MEDICAID

## 2024-10-21 VITALS
WEIGHT: 260.2 LBS | OXYGEN SATURATION: 98 % | BODY MASS INDEX: 32.52 KG/M2 | SYSTOLIC BLOOD PRESSURE: 141 MMHG | DIASTOLIC BLOOD PRESSURE: 96 MMHG | HEART RATE: 59 BPM

## 2024-10-21 DIAGNOSIS — N46.8 INFERTILITY, MALE, POST-VASECTOMY REVERSAL: ICD-10-CM

## 2024-10-21 DIAGNOSIS — Z98.890 INFERTILITY, MALE, POST-VASECTOMY REVERSAL: ICD-10-CM

## 2024-10-21 DIAGNOSIS — J40 BRONCHITIS: Primary | ICD-10-CM

## 2024-10-21 DIAGNOSIS — J35.1 SWOLLEN TONSIL: ICD-10-CM

## 2024-10-21 PROCEDURE — 99214 OFFICE O/P EST MOD 30 MIN: CPT | Performed by: INTERNAL MEDICINE

## 2024-10-21 PROCEDURE — 1036F TOBACCO NON-USER: CPT | Performed by: INTERNAL MEDICINE

## 2024-10-21 RX ORDER — AZITHROMYCIN 250 MG/1
TABLET, FILM COATED ORAL
Qty: 6 TABLET | Refills: 0 | Status: SHIPPED | OUTPATIENT
Start: 2024-10-21 | End: 2024-10-25

## 2024-10-21 RX ORDER — BENZONATATE 100 MG/1
100 CAPSULE ORAL 3 TIMES DAILY PRN
Qty: 42 CAPSULE | Refills: 0 | Status: SHIPPED | OUTPATIENT
Start: 2024-10-21 | End: 2024-11-20

## 2024-10-21 ASSESSMENT — ENCOUNTER SYMPTOMS
CHILLS: 0
CHEST TIGHTNESS: 0
SINUS PRESSURE: 0
MYALGIAS: 0
SHORTNESS OF BREATH: 0
WEAKNESS: 0
PALPITATIONS: 0
SORE THROAT: 0
AGITATION: 0
NAUSEA: 0
DIARRHEA: 0
ACTIVITY CHANGE: 0

## 2024-10-21 NOTE — PROGRESS NOTES
Subjective   Patient ID: Zen Boothe is a 49 y.o. male who presents for ER Follow-up. He went to the ED with a cough and they found swollen tonsils and gave him steroids.      ER Follow-up  Pertinent negatives include no chest pain, chills, congestion, myalgias, nausea, sore throat or weakness.        Review of Systems   Constitutional:  Negative for activity change and chills.   HENT:  Negative for congestion, sinus pressure and sore throat.    Respiratory:  Negative for chest tightness and shortness of breath.    Cardiovascular:  Negative for chest pain and palpitations.   Gastrointestinal:  Negative for diarrhea and nausea.   Musculoskeletal:  Negative for myalgias.   Neurological:  Negative for weakness.   Psychiatric/Behavioral:  Negative for agitation and behavioral problems.        Objective   BP (!) 141/96 (BP Location: Left arm, Patient Position: Sitting, BP Cuff Size: Adult)   Pulse 59   Wt 118 kg (260 lb 3.2 oz)   SpO2 98%   BMI 32.52 kg/m²     Physical Exam  Constitutional:       Appearance: Normal appearance.   HENT:      Head: Normocephalic and atraumatic.      Nose: Nose normal.      Mouth/Throat:      Mouth: Mucous membranes are moist.   Eyes:      Extraocular Movements: Extraocular movements intact.      Pupils: Pupils are equal, round, and reactive to light.   Cardiovascular:      Rate and Rhythm: Normal rate and regular rhythm.   Pulmonary:      Effort: No respiratory distress.      Breath sounds: No wheezing.   Abdominal:      General: Bowel sounds are normal.      Palpations: Abdomen is soft.   Neurological:      General: No focal deficit present.       Assessment/Plan   Assessment & Plan  Bronchitis  I will give him an antibiotic and a cough pill to see if that helps.  Orders:    benzonatate (Tessalon) 100 mg capsule; Take 1 capsule (100 mg) by mouth 3 times a day as needed for cough. Do not crush or chew.    azithromycin (Zithromax) 250 mg tablet; Take 2 tablets (500 mg) by mouth once  daily for 1 day, THEN 1 tablet (250 mg) once daily for 4 days. Take 2 tabs (500 mg) by mouth today, than 1 daily for 4 days..    Swollen tonsil  We will refer her to ENT to further evaluate his tonsils.  Orders:    Referral to ENT; Future

## 2024-10-28 ENCOUNTER — OFFICE VISIT (OUTPATIENT)
Dept: PRIMARY CARE | Facility: HOSPITAL | Age: 49
End: 2024-10-28
Payer: MEDICAID

## 2024-10-28 VITALS
SYSTOLIC BLOOD PRESSURE: 132 MMHG | RESPIRATION RATE: 18 BRPM | TEMPERATURE: 98.4 F | HEART RATE: 80 BPM | DIASTOLIC BLOOD PRESSURE: 86 MMHG

## 2024-10-28 DIAGNOSIS — J45.20 MILD INTERMITTENT COLD-INDUCED ASTHMA WITHOUT COMPLICATION (HHS-HCC): Primary | ICD-10-CM

## 2024-10-28 PROBLEM — J45.909: Status: ACTIVE | Noted: 2024-10-28

## 2024-10-28 PROCEDURE — 99214 OFFICE O/P EST MOD 30 MIN: CPT | Performed by: INTERNAL MEDICINE

## 2024-10-28 RX ORDER — ALBUTEROL SULFATE 90 UG/1
2 INHALANT RESPIRATORY (INHALATION) EVERY 4 HOURS PRN
Qty: 8 G | Refills: 5 | Status: SHIPPED | OUTPATIENT
Start: 2024-10-28 | End: 2025-10-28

## 2024-10-28 ASSESSMENT — ENCOUNTER SYMPTOMS
SHORTNESS OF BREATH: 0
SINUS PRESSURE: 0
MYALGIAS: 0
CHILLS: 0
AGITATION: 0
COUGH: 1
CHEST TIGHTNESS: 0
PALPITATIONS: 0
DIARRHEA: 0
NAUSEA: 0
WEAKNESS: 0
SORE THROAT: 0
ACTIVITY CHANGE: 0

## 2024-12-22 ENCOUNTER — APPOINTMENT (OUTPATIENT)
Dept: RADIOLOGY | Facility: HOSPITAL | Age: 49
End: 2024-12-22
Payer: MEDICAID

## 2024-12-22 ENCOUNTER — HOSPITAL ENCOUNTER (EMERGENCY)
Dept: CARDIOLOGY | Facility: HOSPITAL | Age: 49
Discharge: HOME | End: 2024-12-22
Payer: MEDICAID

## 2024-12-22 ENCOUNTER — APPOINTMENT (OUTPATIENT)
Dept: CARDIOLOGY | Facility: HOSPITAL | Age: 49
End: 2024-12-22
Payer: MEDICAID

## 2024-12-22 ENCOUNTER — HOSPITAL ENCOUNTER (EMERGENCY)
Facility: HOSPITAL | Age: 49
Discharge: HOME | End: 2024-12-22
Attending: STUDENT IN AN ORGANIZED HEALTH CARE EDUCATION/TRAINING PROGRAM
Payer: MEDICAID

## 2024-12-22 VITALS
RESPIRATION RATE: 19 BRPM | HEIGHT: 75 IN | DIASTOLIC BLOOD PRESSURE: 87 MMHG | BODY MASS INDEX: 32.33 KG/M2 | WEIGHT: 260 LBS | OXYGEN SATURATION: 98 % | HEART RATE: 63 BPM | SYSTOLIC BLOOD PRESSURE: 137 MMHG | TEMPERATURE: 97.5 F

## 2024-12-22 DIAGNOSIS — J18.9 PNEUMONIA OF RIGHT LOWER LOBE DUE TO INFECTIOUS ORGANISM: Primary | ICD-10-CM

## 2024-12-22 DIAGNOSIS — R07.9 CHEST PAIN, UNSPECIFIED TYPE: ICD-10-CM

## 2024-12-22 DIAGNOSIS — J18.9 PNEUMONIA OF RIGHT LOWER LOBE DUE TO INFECTIOUS ORGANISM: ICD-10-CM

## 2024-12-22 DIAGNOSIS — R07.9 CHEST PAIN: ICD-10-CM

## 2024-12-22 LAB
ANION GAP SERPL CALC-SCNC: 11 MMOL/L (ref 10–20)
BASOPHILS # BLD AUTO: 0.03 X10*3/UL (ref 0–0.1)
BASOPHILS NFR BLD AUTO: 0.8 %
BUN SERPL-MCNC: 18 MG/DL (ref 6–23)
CALCIUM SERPL-MCNC: 9 MG/DL (ref 8.6–10.3)
CARDIAC TROPONIN I PNL SERPL HS: 10 NG/L (ref 0–20)
CHLORIDE SERPL-SCNC: 109 MMOL/L (ref 98–107)
CO2 SERPL-SCNC: 23 MMOL/L (ref 21–32)
CREAT SERPL-MCNC: 0.98 MG/DL (ref 0.5–1.3)
EGFRCR SERPLBLD CKD-EPI 2021: >90 ML/MIN/1.73M*2
EOSINOPHIL # BLD AUTO: 0.1 X10*3/UL (ref 0–0.7)
EOSINOPHIL NFR BLD AUTO: 2.5 %
ERYTHROCYTE [DISTWIDTH] IN BLOOD BY AUTOMATED COUNT: 11.9 % (ref 11.5–14.5)
GLUCOSE SERPL-MCNC: 99 MG/DL (ref 74–99)
HCT VFR BLD AUTO: 41.6 % (ref 41–52)
HGB BLD-MCNC: 14.2 G/DL (ref 13.5–17.5)
IMM GRANULOCYTES # BLD AUTO: 0.01 X10*3/UL (ref 0–0.7)
IMM GRANULOCYTES NFR BLD AUTO: 0.3 % (ref 0–0.9)
LYMPHOCYTES # BLD AUTO: 1.68 X10*3/UL (ref 1.2–4.8)
LYMPHOCYTES NFR BLD AUTO: 42.2 %
MCH RBC QN AUTO: 31.8 PG (ref 26–34)
MCHC RBC AUTO-ENTMCNC: 34.1 G/DL (ref 32–36)
MCV RBC AUTO: 93 FL (ref 80–100)
MONOCYTES # BLD AUTO: 0.37 X10*3/UL (ref 0.1–1)
MONOCYTES NFR BLD AUTO: 9.3 %
NEUTROPHILS # BLD AUTO: 1.79 X10*3/UL (ref 1.2–7.7)
NEUTROPHILS NFR BLD AUTO: 44.9 %
NRBC BLD-RTO: 0 /100 WBCS (ref 0–0)
PLATELET # BLD AUTO: 226 X10*3/UL (ref 150–450)
POTASSIUM SERPL-SCNC: 4 MMOL/L (ref 3.5–5.3)
RBC # BLD AUTO: 4.47 X10*6/UL (ref 4.5–5.9)
SODIUM SERPL-SCNC: 139 MMOL/L (ref 136–145)
WBC # BLD AUTO: 4 X10*3/UL (ref 4.4–11.3)

## 2024-12-22 PROCEDURE — 93005 ELECTROCARDIOGRAM TRACING: CPT

## 2024-12-22 PROCEDURE — 85025 COMPLETE CBC W/AUTO DIFF WBC: CPT | Performed by: STUDENT IN AN ORGANIZED HEALTH CARE EDUCATION/TRAINING PROGRAM

## 2024-12-22 PROCEDURE — 84484 ASSAY OF TROPONIN QUANT: CPT | Performed by: STUDENT IN AN ORGANIZED HEALTH CARE EDUCATION/TRAINING PROGRAM

## 2024-12-22 PROCEDURE — 71046 X-RAY EXAM CHEST 2 VIEWS: CPT | Performed by: RADIOLOGY

## 2024-12-22 PROCEDURE — 71046 X-RAY EXAM CHEST 2 VIEWS: CPT

## 2024-12-22 PROCEDURE — 99285 EMERGENCY DEPT VISIT HI MDM: CPT | Mod: 25 | Performed by: STUDENT IN AN ORGANIZED HEALTH CARE EDUCATION/TRAINING PROGRAM

## 2024-12-22 PROCEDURE — 36415 COLL VENOUS BLD VENIPUNCTURE: CPT | Performed by: STUDENT IN AN ORGANIZED HEALTH CARE EDUCATION/TRAINING PROGRAM

## 2024-12-22 PROCEDURE — 80048 BASIC METABOLIC PNL TOTAL CA: CPT | Performed by: STUDENT IN AN ORGANIZED HEALTH CARE EDUCATION/TRAINING PROGRAM

## 2024-12-22 RX ORDER — DOXYCYCLINE 100 MG/1
100 CAPSULE ORAL 2 TIMES DAILY
Qty: 20 CAPSULE | Refills: 0 | Status: SHIPPED | OUTPATIENT
Start: 2024-12-22 | End: 2025-01-01

## 2024-12-22 ASSESSMENT — PAIN DESCRIPTION - ORIENTATION: ORIENTATION: RIGHT

## 2024-12-22 ASSESSMENT — PAIN DESCRIPTION - PAIN TYPE: TYPE: ACUTE PAIN

## 2024-12-22 ASSESSMENT — COLUMBIA-SUICIDE SEVERITY RATING SCALE - C-SSRS
6. HAVE YOU EVER DONE ANYTHING, STARTED TO DO ANYTHING, OR PREPARED TO DO ANYTHING TO END YOUR LIFE?: NO
2. HAVE YOU ACTUALLY HAD ANY THOUGHTS OF KILLING YOURSELF?: NO
1. IN THE PAST MONTH, HAVE YOU WISHED YOU WERE DEAD OR WISHED YOU COULD GO TO SLEEP AND NOT WAKE UP?: NO

## 2024-12-22 ASSESSMENT — PAIN SCALES - GENERAL: PAINLEVEL_OUTOF10: 7

## 2024-12-22 ASSESSMENT — PAIN - FUNCTIONAL ASSESSMENT: PAIN_FUNCTIONAL_ASSESSMENT: 0-10

## 2024-12-22 ASSESSMENT — PAIN DESCRIPTION - DESCRIPTORS
DESCRIPTORS: BURNING
DESCRIPTORS: BURNING

## 2024-12-22 ASSESSMENT — PAIN DESCRIPTION - LOCATION: LOCATION: CHEST

## 2024-12-22 NOTE — ED TRIAGE NOTES
Pt coming in for a burning chest pain on the right side of his chest. States that last time he had this he had walking pneumonia. Took ibuprofen which helped the pain a little bit. Denies any cardiac hx. States that he has some upper respiratory cold like symptoms with cough as well.

## 2024-12-24 PROCEDURE — 93005 ELECTROCARDIOGRAM TRACING: CPT

## 2024-12-26 LAB
ATRIAL RATE: 60 BPM
ATRIAL RATE: 64 BPM
P AXIS: 40 DEGREES
P AXIS: 64 DEGREES
P OFFSET: 170 MS
P ONSET: 112 MS
PR INTERVAL: 189 MS
PR INTERVAL: 200 MS
Q ONSET: 212 MS
Q ONSET: 252 MS
QRS COUNT: 10 BEATS
QRS COUNT: 10 BEATS
QRS DURATION: 100 MS
QRS DURATION: 99 MS
QT INTERVAL: 396 MS
QT INTERVAL: 402 MS
QTC CALCULATION(BAZETT): 402 MS
QTC CALCULATION(BAZETT): 406 MS
QTC FREDERICIA: 402 MS
QTC FREDERICIA: 402 MS
R AXIS: -42 DEGREES
R AXIS: 4 DEGREES
T AXIS: 32 DEGREES
T AXIS: 37 DEGREES
T OFFSET: 413 MS
T OFFSET: 450 MS
VENTRICULAR RATE: 60 BPM
VENTRICULAR RATE: 63 BPM

## 2024-12-26 NOTE — ED PROVIDER NOTES
HPI   Chief Complaint   Patient presents with    Chest Pain     Pt coming in for a burning chest pain on the right side of his chest. States that last time he had this he had walking pneumonia. Took ibuprofen which helped the pain a little bit. Denies any cardiac hx. States that he has some upper respiratory cold like symptoms with cough as well.        The patient is a 49-year-old male with past medical history as below presents today for right sided burning chest pain has been present for over a day.  Associated with cough.  States feels like prior pneumonia.  No history of MI CVA hypertension hyperlipidemia or smoking.  No fever or international travel              Patient History   Past Medical History:   Diagnosis Date    Bilateral carpal tunnel syndrome     History of dysuria     History of sciatica     History of solitary pulmonary nodule     Intermittent chest pain     Leukopenia     Lumbosacral radiculitis     Lumbosacral strain     Radial styloid tenosynovitis (de quervain)     De Quervain's tenosynovitis    Tinea unguium     Onychomycosis of toenail     Past Surgical History:   Procedure Laterality Date    WISDOM TOOTH EXTRACTION       Family History   Problem Relation Name Age of Onset    Hypertension Mother      Diabetes Mother      No Known Problems Father      No Known Problems Sister      No Known Problems Brother      No Known Problems Mother's Sister      No Known Problems Mother's Brother      No Known Problems Father's Sister      Heart attack Father's Brother      Coronary artery disease Maternal Grandmother      Rheumatic fever Maternal Grandmother      Glaucoma Maternal Grandfather      No Known Problems Paternal Grandmother      No Known Problems Paternal Grandfather      No Known Problems Daughter      No Known Problems Son       Social History     Tobacco Use    Smoking status: Some Days     Current packs/day: 0.00     Types: Cigars, Cigarettes     Last attempt to quit: 2018     Years since  quittin.0     Passive exposure: Past    Smokeless tobacco: Never   Vaping Use    Vaping status: Never Used   Substance Use Topics    Alcohol use: Yes     Alcohol/week: 3.0 standard drinks of alcohol     Types: 3 Shots of liquor per week    Drug use: Never       Physical Exam   ED Triage Vitals [24 1831]   Temperature Heart Rate Respirations BP   36.4 °C (97.5 °F) 66 16 124/75      Pulse Ox Temp Source Heart Rate Source Patient Position   96 % Tympanic Monitor --      BP Location FiO2 (%)     -- --       Physical Exam  Vitals and nursing note reviewed.   Constitutional:       Appearance: Normal appearance.   HENT:      Head: Normocephalic and atraumatic.      Right Ear: External ear normal.      Left Ear: External ear normal.      Nose: Nose normal.      Mouth/Throat:      Mouth: Mucous membranes are moist.   Cardiovascular:      Rate and Rhythm: Normal rate and regular rhythm.      Pulses: Normal pulses.      Heart sounds: Normal heart sounds.   Pulmonary:      Effort: Pulmonary effort is normal.      Breath sounds: Examination of the right-lower field reveals rhonchi. Rhonchi present.   Abdominal:      General: Abdomen is flat. There is no distension.      Palpations: Abdomen is soft.      Tenderness: There is no abdominal tenderness. There is no guarding or rebound.   Musculoskeletal:         General: No deformity.   Skin:     General: Skin is warm.   Neurological:      General: No focal deficit present.      Mental Status: He is alert and oriented to person, place, and time. Mental status is at baseline.           ED Course & MDM   ED Course as of 25 1205      1203 ECG 12 lead  EKG shows normal sinus rhythm rate 63 normal intervals [SE]      ED Course User Index  [SE] Eriberto Trevino MD         Diagnoses as of 25 1205   Pneumonia of right lower lobe due to infectious organism   Chest pain, unspecified type                 No data recorded     Emily Coma Scale Score: 15  (12/22/24 1834 : Oswaldo Clinton, BASIA)                           Medical Decision Making  Patient presents for evaluation of right-sided burning chest pain.  Given duration of symptoms EKG and troponin cardiac cause it is very unlikely.  He does have focal breath sounds on examination precisely where the pain hurts.  At clinically this is consistent with pneumonia.  Recommended treatment with oral antibiotics given vitals labs at this time.  Return precautions given follow-up with primary care for further workup and evaluation    Amount and/or Complexity of Data Reviewed  Labs: ordered.  Radiology: ordered.  ECG/medicine tests: ordered and independent interpretation performed.    Risk  Prescription drug management.  Decision regarding hospitalization.        Procedure  Procedures     Eriberto Trevino MD  12/26/24 1403       Eriberto Trevino MD  01/22/25 1205

## 2025-01-09 ENCOUNTER — APPOINTMENT (OUTPATIENT)
Dept: PRIMARY CARE | Facility: CLINIC | Age: 50
End: 2025-01-09
Payer: MEDICAID

## 2025-01-09 VITALS
WEIGHT: 258 LBS | OXYGEN SATURATION: 96 % | SYSTOLIC BLOOD PRESSURE: 125 MMHG | HEART RATE: 60 BPM | TEMPERATURE: 98 F | BODY MASS INDEX: 32.08 KG/M2 | HEIGHT: 75 IN | DIASTOLIC BLOOD PRESSURE: 80 MMHG

## 2025-01-09 DIAGNOSIS — K22.4 NUTCRACKER ESOPHAGUS: Primary | ICD-10-CM

## 2025-01-09 PROCEDURE — 99214 OFFICE O/P EST MOD 30 MIN: CPT | Performed by: INTERNAL MEDICINE

## 2025-01-09 PROCEDURE — 3008F BODY MASS INDEX DOCD: CPT | Performed by: INTERNAL MEDICINE

## 2025-01-09 PROCEDURE — 1036F TOBACCO NON-USER: CPT | Performed by: INTERNAL MEDICINE

## 2025-01-09 ASSESSMENT — ENCOUNTER SYMPTOMS
CHEST TIGHTNESS: 1
DEPRESSION: 0
WEAKNESS: 0
PALPITATIONS: 0
CHILLS: 0
SINUS PRESSURE: 0
SORE THROAT: 0
MYALGIAS: 0
NAUSEA: 0
AGITATION: 0
DIARRHEA: 0
ACTIVITY CHANGE: 0
SHORTNESS OF BREATH: 0

## 2025-01-09 ASSESSMENT — PAIN SCALES - GENERAL: PAINLEVEL_OUTOF10: 5

## 2025-01-09 NOTE — ASSESSMENT & PLAN NOTE
We will get a swallowing evaluation and a GI consult to rule out hyperdynamic esophagus.    Orders:    FL modified barium swallow study; Future    Referral to Gastroenterology; Future

## 2025-01-09 NOTE — PROGRESS NOTES
"Subjective   Patient ID: Zen Boothe is a 49 y.o. male who presents for Follow-up (ER FOLLOW UP).  The patient has been repeatedly going to the emergency department for right-sided chest pain that has been intermittent and associated with food getting stuck while swallowing.  He has had an EGD in the past which did not show any abnormality.  He is not sure if he had a swallowing eval or not.  He has trouble swallowing and the \"food get stopped.\"  Only solids get stuck but not liquids.  He eats very spicy foods as well and sometimes it gets stuck in his esophagus.    HPI     Review of Systems   Constitutional:  Negative for activity change and chills.   HENT:  Negative for congestion, sinus pressure and sore throat.    Respiratory:  Positive for chest tightness. Negative for shortness of breath.    Cardiovascular:  Positive for chest pain. Negative for palpitations.   Gastrointestinal:  Negative for diarrhea and nausea.   Musculoskeletal:  Negative for myalgias.   Neurological:  Negative for weakness.   Psychiatric/Behavioral:  Negative for agitation and behavioral problems.        Objective   /80 (BP Location: Left arm, Patient Position: Sitting, BP Cuff Size: Adult)   Pulse 60   Temp 36.7 °C (98 °F) (Temporal)   Ht 1.905 m (6' 3\")   Wt 117 kg (258 lb)   SpO2 96%   BMI 32.25 kg/m²     Physical Exam  Constitutional:       Appearance: Normal appearance.   HENT:      Head: Normocephalic and atraumatic.      Nose: Nose normal.      Mouth/Throat:      Mouth: Mucous membranes are moist.   Eyes:      Extraocular Movements: Extraocular movements intact.   Cardiovascular:      Rate and Rhythm: Normal rate and regular rhythm.   Pulmonary:      Effort: No respiratory distress.      Breath sounds: No wheezing.   Abdominal:      General: Bowel sounds are normal.      Palpations: Abdomen is soft.   Neurological:      General: No focal deficit present.       Assessment/Plan   Assessment & Plan  Nutcracker " esophagus  We will get a swallowing evaluation and a GI consult to rule out hyperdynamic esophagus.    Orders:    FL modified barium swallow study; Future    Referral to Gastroenterology; Future

## 2025-01-17 ENCOUNTER — TELEPHONE (OUTPATIENT)
Dept: PRIMARY CARE | Facility: CLINIC | Age: 50
End: 2025-01-17
Payer: MEDICAID

## 2025-01-17 ENCOUNTER — HOSPITAL ENCOUNTER (OUTPATIENT)
Dept: RADIOLOGY | Facility: HOSPITAL | Age: 50
Discharge: HOME | End: 2025-01-17
Payer: MEDICAID

## 2025-01-17 DIAGNOSIS — K22.4 NUTCRACKER ESOPHAGUS: ICD-10-CM

## 2025-01-17 PROCEDURE — 92611 MOTION FLUOROSCOPY/SWALLOW: CPT | Mod: GN | Performed by: SPEECH-LANGUAGE PATHOLOGIST

## 2025-01-17 PROCEDURE — 74230 X-RAY XM SWLNG FUNCJ C+: CPT

## 2025-01-17 PROCEDURE — 92526 ORAL FUNCTION THERAPY: CPT | Mod: GN | Performed by: SPEECH-LANGUAGE PATHOLOGIST

## 2025-01-17 PROCEDURE — 2500000005 HC RX 250 GENERAL PHARMACY W/O HCPCS: Performed by: INTERNAL MEDICINE

## 2025-01-17 RX ADMIN — BARIUM SULFATE 200 ML: 0.81 POWDER, FOR SUSPENSION ORAL at 08:37

## 2025-01-17 NOTE — PROCEDURES
Speech-Language Pathology    Outpatient Modified Barium Swallow Study    Patient Name: Zen Boothe  MRN: [id number]  : 1975  Today's Date: 25         Modified Barium Swallow Study completed. Informed verbal consent obtained prior to completion of exam. Trials of thin, nectar/mildly thick liquid, puree, regular solids and barium tablet with thin liquid were given.   Lateral and a-p views obtained.   C-arm for today's study.     SLP: Ladi Chairez SLP   Contact info: secure chat please    FINAL SPEECH RECOMMENDATIONS    DIET RECOMMENDATIONS:   Oropharyngeal swallow is intact to continue REGULAR TEXTURES and THIN LIQUIDS.     STRATEGIES:  Fully upright for meals/meds, remain upright after PO intake. Reflux precautions.     Education Provided: Results and recommendations per MBSS, with video review. Verbal understanding and agreement given on all accounts.     Additional consult suggested: ENT for laryngoscopy may be beneficial for this patient.     Repeat study/ dc plan: as needed.     Mechanics of the Swallow Summary:  ORAL PHASE:  Lip Closure - No labial escape/anterior loss of bolus   Tongue Control During Bolus Hold - Cohesive bolus between tongue to palatal seal   Bolus prep/mastication - Timely and efficient mastication skills , upper partial, missing teeth.   Bolus transport/lingual motion - Brisk tongue motion for A-P movement of the bolus   Oral residue - Trace residue lining oral structures     PHARYNGEAL PHASE:  Initiation of pharyngeal swallow - Bolus head at posterior angle of ramus   Soft palate elevation - No bolus between soft palate/pharyngeal wall   Laryngeal elevation - Complete superior movement of thyroid cartilage with contact of arytenoids to epiglottic petiole   Anterior hyoid excursion - Complete anterior movement   Epiglottic movement - Complete inversion    Laryngeal vestibule closure - Complete - no air/contrast in laryngeal vestibule   Pharyngeal stripping wave -  "Complete  Pharyngeal contraction (A/P view) - Complete  Pharyngoesophageal segment opening - Complete distension and complete duration/no obstruction of flow of bolus   Tongue base retraction - No bolus between tongue base and posterior pharyngeal wall   Pharyngeal residue - Complete pharyngeal clearance     ESOPHAGEAL PHASE:  Esophageal clearance - Complete clearance     SLP Impressions with Severity Rating:   Intact oral and pharyngeal phases of the swallow.   Bolus flows safely and efficiently from oral cavity, through pharynx, and empties into the esophagus without obstruction.   No aspiration.   No significant pharyngeal or esophageal residue after the swallow.   Please see \"Mechanics of the Swallow\" above and Rosenbek scale below for further details.     Rosenbek's Penetration Aspiration Scale  Thin Liquids: 1. NO ASPIRATION & NO PENETRATION - no aspiration, contrast does not enter airway  [place] Thick Liquids: 1. NO ASPIRATION & NO PENETRATION - no aspiration, contrast does not enter airway  Puree: 1. NO ASPIRATION & NO PENETRATION - no aspiration, contrast does not enter airway  Solids: 1. NO ASPIRATION & NO PENETRATION - no aspiration, contrast does not enter airway    Plan:  SLP Plan: No treatment needs identified at this time   Patient/Caregiver Agreeable: Yes    Short term goals established 01/17/25:   Pt education with video review.  1/17 GOAL MET.     Reason for Referral:  solids feels stuck in throat sometimes, with right sided chest pain. No difficulty with liquids or pills.    Patient Hx: EGD and esophageal impedence testing May 2023; acid reflux, takes meds for this. Bronchitis, frequent right chest pain, costochondritis, musculoskeletal pain  Respiratory Status: Room air  Current diet: No texture restrictions at this time.     Treatment Provided Today: ST provided extensive education to pt regarding anatomy/physiology of swallow function,  reviewed reflux precautions, and further assessment with " "ENT. Video images reviewed of each swallow, textures/consistencies, and lateral and a-p views for pt education. Pt has no further questions.      OUTCOME MEASURES:  Functional Oral Intake Scale  Functional Oral Intake Scale: Level 7        total oral diet with no restrictions     Eating Assessment Tool (EAT-10)   0=No problem, 1=Mild problem, 2=Mild to moderate problem, 3=Moderate problem, 4=Severe problem  My swallowing problem has caused me to lost weight = 3  My swallowing problem interferes with my ability to go out for meals =0  Swallowing liquids takes extra effort =0   Swallowing solids takes extra effort =2  Swallowing pills takes extra effort =2  Swallowing is painful = 1 \"discomfort\"  The pleasure of eating is affected by my swallowing = 3  When I swallow food sticks in my throat =3  I cough when I eat = 1  Swallowing is stressful =  2  Total score of 16   A total score of 3 or above may indicate difficulty with swallowing safely and/or efficiently    Pain:  Pain Scale: 0-10  Ratin     "

## 2025-01-17 NOTE — TELEPHONE ENCOUNTER
"Result Communication    Resulted Orders   FL modified barium swallow study    Narrative    Interpreted By:  Tono Lindsey and Oskowski Bonnie   STUDY:  FL MODIFIED BARIUM SWALLOW STUDY;; 1/17/2025 8:34 am      INDICATION:  Signs/Symptoms:recurrent severe pain and food gets \"stuck\".      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      ACCESSION NUMBER(S):  [id number]      ORDERING CLINICIAN:  JENNIFER TOTH      TECHNIQUE:  Modified Barium Swallow Study completed. Informed verbal consent  obtained prior to completion of exam. Trials of thin, nectar/mildly  thick liquid, puree, regular solids and barium tablet with thin  liquid were given. Lateral and a-p views obtained.  C-arm for today's study.      SLP: Ladi Chairez, SLP  Contact info: secure chat please      SPEECH FINDINGS:  DIET RECOMMENDATIONS:  Oropharyngeal swallow is intact to continue REGULAR TEXTURES and THIN  LIQUIDS.      STRATEGIES:  Fully upright for meals/meds, remain upright after PO intake. Reflux  precautions.      Reason for Referral:  solids feels stuck in throat sometimes, with  right sided chest pain. No difficulty with liquids or pills.      Education Provided: Results and recommendations per MBSS, with video  review. Verbal understanding and agreement given on all accounts.      Additional consult suggested: ENT for laryngoscopy may be beneficial  for this patient.      Repeat study/ dc plan: as needed.      Mechanics of the Swallow Summary:  ORAL PHASE:  Lip Closure - No labial escape/anterior loss of bolus  Tongue Control During Bolus Hold - Cohesive bolus between tongue to  palatal seal Bolus prep/mastication - Timely and efficient  mastication skills , upper partial, missing teeth. Bolus  transport/lingual motion - Brisk tongue motion for A-P movement of  the bolus Oral residue - Trace residue lining oral structures      PHARYNGEAL PHASE:  Initiation of pharyngeal swallow - Bolus head at posterior angle of  ramus Soft " "palate elevation - No bolus between soft palate/pharyngeal  wall Laryngeal elevation - Complete superior movement of thyroid  cartilage with contact of arytenoids to epiglottic petiole Anterior  hyoid excursion - Complete anterior movement Epiglottic movement -  Complete inversion Laryngeal vestibule closure - Complete - no  air/contrast in laryngeal vestibule Pharyngeal stripping wave -  Complete Pharyngeal contraction (A/P view) - Complete  Pharyngoesophageal segment opening - Complete distension and complete  duration/no obstruction of flow of bolus Tongue base retraction - No  bolus between tongue base and posterior pharyngeal wall Pharyngeal  residue - Complete pharyngeal clearance      ESOPHAGEAL PHASE:  Esophageal clearance - Complete clearance      SLP Impressions with Severity Rating:  Intact oral and pharyngeal phases of the swallow.  Bolus flows safely and efficiently from oral cavity, through pharynx,  and empties into the esophagus without obstruction. No aspiration.  No significant pharyngeal or esophageal residue after the swallow.  Please see \"Mechanics of the Swallow\" above and Rosenbek scale below  for further details.      Rosenbek's Penetration Aspiration Scale  Thin Liquids: 1. NO ASPIRATION & NO PENETRATION - no aspiration,  contrast does not enter airway [place] Thick Liquids: 1. NO ASPIRATION  & NO PENETRATION - no aspiration, contrast does not enter airway  Puree: 1. NO ASPIRATION & NO PENETRATION - no aspiration, contrast  does not enter airway Solids: 1. NO ASPIRATION & NO PENETRATION -  no aspiration, contrast does not enter airway      Plan:  SLP Plan: No treatment needs identified at this time  Patient/Caregiver Agreeable: Yes      Short term goals established 01/17/25:  1. Pt education with video review.  1/17 GOAL MET.      Patient Hx: EGD and esophageal impedence testing May 2023; acid  reflux, takes meds for this. Bronchitis, frequent right chest pain,  costochondritis, " "musculoskeletal pain Respiratory Status: Room air  Current diet: No texture restrictions at this time.      Treatment Provided Today: ST provided extensive education to pt  regarding anatomy/physiology of swallow function,  reviewed reflux  precautions, and further assessment with ENT. Video images reviewed  of each swallow, textures/consistencies, and lateral and a-p views  for pt education. Pt has no further questions.      OUTCOME MEASURES:  Functional Oral Intake Scale  Functional Oral Intake Scale: Level 7        total oral diet with no  restrictions      Eating Assessment Tool (EAT-10)  0=No problem, 1=Mild problem, 2=Mild to moderate problem, 3=Moderate  problem, 4=Severe problem  1. My swallowing problem has caused me to lost weight = 3  2. My swallowing problem interferes with my ability to go out for  meals =0  3. Swallowing liquids takes extra effort =0  4. Swallowing solids takes extra effort =2  5. Swallowing pills takes extra effort =2  6. Swallowing is painful = 1 \"discomfort\"  7. The pleasure of eating is affected by my swallowing = 3  8. When I swallow food sticks in my throat =3  9. I cough when I eat = 1  10. Swallowing is stressful =  2  Total score of 16  A total score of 3 or above may indicate difficulty with swallowing  safely and/or efficiently      Pain:  Pain Scale: 0-10  Ratin      Speech Therapy section of this report signed by Ladi Chairez MA  CCC-SLP on 2025 at 9:51 am.      RADIOLOGY FINDINGS:  I concur with the aforementioned findings.      Radiology section of this report signed by César CHRISTY.        Impression    1. Please refer to the body of the report for further details of the  stages of the study, findings as well as the speech therapist's  notes/recommendations      Rosenbek's Penetration Aspiration Scale  Thin Liquids: 1. NO ASPIRATION & NO PENETRATION - no aspiration,  contrast does not enter airway [place] Thick Liquids: 1. NO ASPIRATION  & NO PENETRATION - no " aspiration, contrast does not enter airway  Puree: 1. NO ASPIRATION & NO PENETRATION - no aspiration, contrast  does not enter airway Solids: 1. NO ASPIRATION & NO PENETRATION -  no aspiration, contrast does not enter airway      MACRO:  None      Signed by: Tono Lindsey 1/17/2025 9:55 AM  Dictation workstation:   [id number]       10:55 AM      Results were successfully communicated with the patient and they acknowledged their understanding.  He reports that his symptoms have improved since he has been taking the acid blocker on a daily basis.

## 2025-01-18 NOTE — PROGRESS NOTES
Subjective   Patient ID: Zen Boothe is a 49 y.o. male who presents TO DISCUSS REVERSAL OF A VASECTOMY THAT WAS DONE AROUND 2009.  PT IS ABOUT TO GET  AGAIN.       RObjective   Physical Exam    General-- well developed, well nourished in NAD  Head-- normal cephalic, atraumatic  Eyes-- PERRL, EOM'S FROM,  no  jaundice  Neck-- Supple, without masses  Chest-- Normal bony structure  Abdomen-- soft, non tender, liver spleen not palpable . No supra pubic masses  Lymph nodes-- No inguinal lymphadenopathy noted  Testis-- both down, non tender, without masses  Epididymis-- no masses palpable  Scrotum -- no hydrocele noted  Extremities -- Normal muscle mass and tone for the patients age  Neurological-- oriented times three    Assessment/Plan   A:  PT WOULD LIKE TO HAVE A VAS REVERSAL  PROCEDURE DISCUSSED IN DETAIL WITH THE PT  ALL QUESTIONS ANSWERED   P:  WILL REFER THE PT  TO SOMEONE WHO IS DOING A  VASOVASOSTOMY  ON A REGULAR BASIS BECAUSE THE SUCCESS IS DEPENDENT ON HOW OFTEN A SURGEON IS DOING THE PROCEDURE.      Dilan Prince MD 01/18/25 2:49 PM

## 2025-01-20 ENCOUNTER — OFFICE VISIT (OUTPATIENT)
Dept: UROLOGY | Facility: CLINIC | Age: 50
End: 2025-01-20
Payer: MEDICAID

## 2025-01-20 VITALS
SYSTOLIC BLOOD PRESSURE: 145 MMHG | HEIGHT: 75 IN | BODY MASS INDEX: 32.33 KG/M2 | HEART RATE: 66 BPM | RESPIRATION RATE: 16 BRPM | DIASTOLIC BLOOD PRESSURE: 85 MMHG | WEIGHT: 260 LBS | TEMPERATURE: 97.2 F

## 2025-01-20 DIAGNOSIS — N46.8 INFERTILITY, MALE, POST-VASECTOMY REVERSAL: ICD-10-CM

## 2025-01-20 DIAGNOSIS — Z98.890 INFERTILITY, MALE, POST-VASECTOMY REVERSAL: ICD-10-CM

## 2025-01-20 PROCEDURE — 99202 OFFICE O/P NEW SF 15 MIN: CPT | Performed by: UROLOGY

## 2025-01-20 PROCEDURE — 3008F BODY MASS INDEX DOCD: CPT | Performed by: UROLOGY

## 2025-01-20 PROCEDURE — 99212 OFFICE O/P EST SF 10 MIN: CPT | Performed by: UROLOGY

## 2025-01-20 SDOH — ECONOMIC STABILITY: FOOD INSECURITY: WITHIN THE PAST 12 MONTHS, YOU WORRIED THAT YOUR FOOD WOULD RUN OUT BEFORE YOU GOT MONEY TO BUY MORE.: NEVER TRUE

## 2025-01-20 SDOH — ECONOMIC STABILITY: FOOD INSECURITY: WITHIN THE PAST 12 MONTHS, THE FOOD YOU BOUGHT JUST DIDN'T LAST AND YOU DIDN'T HAVE MONEY TO GET MORE.: NEVER TRUE

## 2025-01-20 ASSESSMENT — ENCOUNTER SYMPTOMS
DEPRESSION: 0
LOSS OF SENSATION IN FEET: 0
OCCASIONAL FEELINGS OF UNSTEADINESS: 0

## 2025-01-20 ASSESSMENT — PAIN SCALES - GENERAL: PAINLEVEL_OUTOF10: 0-NO PAIN

## 2025-01-20 NOTE — LETTER
January 21, 2025     Nithin Sarmiento MD  [address]  [address]  [facility] [address]    Patient: Zen Boothe   YOB: 1975   Date of Visit: 1/20/2025       Dear Dr. Nithin Sarmiento MD:    Thank you for referring Zen Boothe to me for evaluation. Below are my notes for this consultation.  If you have questions, please do not hesitate to call me. I look forward to following your patient along with you.       Sincerely,     Dilan Prince MD      CC: No Recipients  ______________________________________________________________________________________    Subjective  Patient ID: Zen Boothe is a 49 y.o. male who presents TO DISCUSS REVERSAL OF A VASECTOMY THAT WAS DONE AROUND 2009.  PT IS ABOUT TO GET  AGAIN.       RObjective   Physical Exam    General-- well developed, well nourished in NAD  Head-- normal cephalic, atraumatic  Eyes-- PERRL, EOM'S FROM,  no  jaundice  Neck-- Supple, without masses  Chest-- Normal bony structure  Abdomen-- soft, non tender, liver spleen not palpable . No supra pubic masses  Lymph nodes-- No inguinal lymphadenopathy noted  Testis-- both down, non tender, without masses  Epididymis-- no masses palpable  Scrotum -- no hydrocele noted  Extremities -- Normal muscle mass and tone for the patients age  Neurological-- oriented times three    Assessment/Plan   A:  PT WOULD LIKE TO HAVE A VAS REVERSAL  PROCEDURE DISCUSSED IN DETAIL WITH THE PT  ALL QUESTIONS ANSWERED   P:  WILL REFER THE PT  TO SOMEONE WHO IS DOING A  VASOVASOSTOMY  ON A REGULAR BASIS BECAUSE THE SUCCESS IS DEPENDENT ON HOW OFTEN A SURGEON IS DOING THE PROCEDURE.      Dilan Prince MD 01/18/25 2:49 PM

## 2025-01-31 DIAGNOSIS — M94.0 COSTOCHONDRITIS: ICD-10-CM

## 2025-01-31 DIAGNOSIS — M79.18 MUSCULOSKELETAL PAIN: ICD-10-CM

## 2025-01-31 DIAGNOSIS — K21.9 GASTROESOPHAGEAL REFLUX DISEASE WITHOUT ESOPHAGITIS: ICD-10-CM

## 2025-02-03 RX ORDER — PANTOPRAZOLE SODIUM 40 MG/1
40 TABLET, DELAYED RELEASE ORAL
Qty: 90 TABLET | Refills: 2 | Status: SHIPPED | OUTPATIENT
Start: 2025-02-03

## 2025-02-03 RX ORDER — IBUPROFEN 800 MG/1
800 TABLET ORAL EVERY 8 HOURS PRN
Qty: 90 TABLET | Refills: 2 | Status: SHIPPED | OUTPATIENT
Start: 2025-02-03 | End: 2026-02-03

## 2025-02-10 NOTE — PATIENT INSTRUCTIONS
Take Pantoprazole as prescribed for heart burn.  Healthy diet.  Increase water intake.  Follow-up 6 months.    
Yes (1)

## 2025-05-15 ENCOUNTER — APPOINTMENT (OUTPATIENT)
Dept: OPHTHALMOLOGY | Facility: CLINIC | Age: 50
End: 2025-05-15
Payer: MEDICAID

## 2025-06-05 ENCOUNTER — HOSPITAL ENCOUNTER (OUTPATIENT)
Dept: CARDIOLOGY | Facility: HOSPITAL | Age: 50
Discharge: HOME | End: 2025-06-05

## 2025-06-05 ENCOUNTER — HOSPITAL ENCOUNTER (EMERGENCY)
Facility: HOSPITAL | Age: 50
Discharge: HOME | End: 2025-06-06

## 2025-06-05 DIAGNOSIS — R07.9 CHEST PAIN, UNSPECIFIED TYPE: Primary | ICD-10-CM

## 2025-06-05 DIAGNOSIS — R07.9 CHEST PAIN: ICD-10-CM

## 2025-06-05 PROCEDURE — 99284 EMERGENCY DEPT VISIT MOD MDM: CPT

## 2025-06-05 ASSESSMENT — PAIN DESCRIPTION - PAIN TYPE: TYPE: ACUTE PAIN

## 2025-06-05 ASSESSMENT — PAIN SCALES - GENERAL: PAINLEVEL_OUTOF10: 8

## 2025-06-05 ASSESSMENT — PAIN DESCRIPTION - LOCATION: LOCATION: CHEST

## 2025-06-05 ASSESSMENT — PAIN - FUNCTIONAL ASSESSMENT: PAIN_FUNCTIONAL_ASSESSMENT: 0-10

## 2025-06-06 ENCOUNTER — APPOINTMENT (OUTPATIENT)
Dept: RADIOLOGY | Facility: HOSPITAL | Age: 50
End: 2025-06-06

## 2025-06-06 VITALS
OXYGEN SATURATION: 99 % | RESPIRATION RATE: 14 BRPM | DIASTOLIC BLOOD PRESSURE: 88 MMHG | HEART RATE: 53 BPM | SYSTOLIC BLOOD PRESSURE: 148 MMHG | TEMPERATURE: 97 F

## 2025-06-06 LAB
ALBUMIN SERPL BCP-MCNC: 4 G/DL (ref 3.4–5)
ALP SERPL-CCNC: 49 U/L (ref 33–120)
ALT SERPL W P-5'-P-CCNC: 35 U/L (ref 10–52)
ANION GAP SERPL CALC-SCNC: 10 MMOL/L (ref 10–20)
AST SERPL W P-5'-P-CCNC: 32 U/L (ref 9–39)
BASOPHILS # BLD AUTO: 0.03 X10*3/UL (ref 0–0.1)
BASOPHILS NFR BLD AUTO: 0.7 %
BILIRUB SERPL-MCNC: 0.8 MG/DL (ref 0–1.2)
BUN SERPL-MCNC: 18 MG/DL (ref 6–23)
CALCIUM SERPL-MCNC: 8.7 MG/DL (ref 8.6–10.3)
CARDIAC TROPONIN I PNL SERPL HS: 8 NG/L (ref 0–20)
CARDIAC TROPONIN I PNL SERPL HS: 9 NG/L (ref 0–20)
CHLORIDE SERPL-SCNC: 108 MMOL/L (ref 98–107)
CO2 SERPL-SCNC: 24 MMOL/L (ref 21–32)
CREAT SERPL-MCNC: 1.05 MG/DL (ref 0.5–1.3)
D DIMER PPP FEU-MCNC: 263 NG/ML FEU
EGFRCR SERPLBLD CKD-EPI 2021: 88 ML/MIN/1.73M*2
EOSINOPHIL # BLD AUTO: 0.06 X10*3/UL (ref 0–0.7)
EOSINOPHIL NFR BLD AUTO: 1.5 %
ERYTHROCYTE [DISTWIDTH] IN BLOOD BY AUTOMATED COUNT: 12.3 % (ref 11.5–14.5)
GLUCOSE SERPL-MCNC: 109 MG/DL (ref 74–99)
HCT VFR BLD AUTO: 39.6 % (ref 41–52)
HGB BLD-MCNC: 13.1 G/DL (ref 13.5–17.5)
IMM GRANULOCYTES # BLD AUTO: 0 X10*3/UL (ref 0–0.7)
IMM GRANULOCYTES NFR BLD AUTO: 0 % (ref 0–0.9)
INR PPP: 1 (ref 0.9–1.1)
LYMPHOCYTES # BLD AUTO: 2.33 X10*3/UL (ref 1.2–4.8)
LYMPHOCYTES NFR BLD AUTO: 57.7 %
MCH RBC QN AUTO: 31.8 PG (ref 26–34)
MCHC RBC AUTO-ENTMCNC: 33.1 G/DL (ref 32–36)
MCV RBC AUTO: 96 FL (ref 80–100)
MONOCYTES # BLD AUTO: 0.33 X10*3/UL (ref 0.1–1)
MONOCYTES NFR BLD AUTO: 8.2 %
NEUTROPHILS # BLD AUTO: 1.29 X10*3/UL (ref 1.2–7.7)
NEUTROPHILS NFR BLD AUTO: 31.9 %
NRBC BLD-RTO: 0 /100 WBCS (ref 0–0)
PLATELET # BLD AUTO: 183 X10*3/UL (ref 150–450)
POTASSIUM SERPL-SCNC: 3.8 MMOL/L (ref 3.5–5.3)
PROT SERPL-MCNC: 6.8 G/DL (ref 6.4–8.2)
PROTHROMBIN TIME: 11.2 SECONDS (ref 9.8–12.4)
RBC # BLD AUTO: 4.12 X10*6/UL (ref 4.5–5.9)
SODIUM SERPL-SCNC: 138 MMOL/L (ref 136–145)
WBC # BLD AUTO: 4 X10*3/UL (ref 4.4–11.3)

## 2025-06-06 PROCEDURE — 84484 ASSAY OF TROPONIN QUANT: CPT | Performed by: STUDENT IN AN ORGANIZED HEALTH CARE EDUCATION/TRAINING PROGRAM

## 2025-06-06 PROCEDURE — 85379 FIBRIN DEGRADATION QUANT: CPT | Performed by: PHYSICIAN ASSISTANT

## 2025-06-06 PROCEDURE — 85025 COMPLETE CBC W/AUTO DIFF WBC: CPT | Performed by: STUDENT IN AN ORGANIZED HEALTH CARE EDUCATION/TRAINING PROGRAM

## 2025-06-06 PROCEDURE — 96374 THER/PROPH/DIAG INJ IV PUSH: CPT

## 2025-06-06 PROCEDURE — 2500000004 HC RX 250 GENERAL PHARMACY W/ HCPCS (ALT 636 FOR OP/ED): Performed by: PHYSICIAN ASSISTANT

## 2025-06-06 PROCEDURE — 36415 COLL VENOUS BLD VENIPUNCTURE: CPT | Performed by: STUDENT IN AN ORGANIZED HEALTH CARE EDUCATION/TRAINING PROGRAM

## 2025-06-06 PROCEDURE — 80053 COMPREHEN METABOLIC PANEL: CPT | Performed by: STUDENT IN AN ORGANIZED HEALTH CARE EDUCATION/TRAINING PROGRAM

## 2025-06-06 PROCEDURE — 71045 X-RAY EXAM CHEST 1 VIEW: CPT

## 2025-06-06 PROCEDURE — 85610 PROTHROMBIN TIME: CPT | Performed by: STUDENT IN AN ORGANIZED HEALTH CARE EDUCATION/TRAINING PROGRAM

## 2025-06-06 PROCEDURE — 71045 X-RAY EXAM CHEST 1 VIEW: CPT | Performed by: SURGERY

## 2025-06-06 RX ORDER — KETOROLAC TROMETHAMINE 30 MG/ML
15 INJECTION, SOLUTION INTRAMUSCULAR; INTRAVENOUS ONCE
Status: COMPLETED | OUTPATIENT
Start: 2025-06-06 | End: 2025-06-06

## 2025-06-06 RX ORDER — IBUPROFEN 600 MG/1
600 TABLET, FILM COATED ORAL 3 TIMES DAILY
Qty: 15 TABLET | Refills: 0 | Status: SHIPPED | OUTPATIENT
Start: 2025-06-06 | End: 2025-06-11

## 2025-06-06 RX ADMIN — KETOROLAC TROMETHAMINE 15 MG: 30 INJECTION, SOLUTION INTRAMUSCULAR at 00:37

## 2025-06-06 ASSESSMENT — HEART SCORE
ECG: NORMAL
RISK FACTORS: 1-2 RISK FACTORS
HISTORY: SLIGHTLY SUSPICIOUS
TROPONIN: LESS THAN OR EQUAL TO NORMAL LIMIT
AGE: 45-64
HEART SCORE: 2

## 2025-06-06 ASSESSMENT — PAIN DESCRIPTION - LOCATION: LOCATION: CHEST

## 2025-06-06 ASSESSMENT — PAIN SCALES - GENERAL: PAINLEVEL_OUTOF10: 7

## 2025-06-06 ASSESSMENT — PAIN - FUNCTIONAL ASSESSMENT: PAIN_FUNCTIONAL_ASSESSMENT: 0-10

## 2025-06-06 NOTE — ED PROVIDER NOTES
Limitations to History: none  External Records Reviewed  Independent Historians: self  Social determinants affecting care: none    HPI  Zen Boothe is a 49 y.o. male who presents emergency department for assessment of chest pain for the last 5 months.  He reports that his PCP told him this was musculoskeletal pain.  He is prescribed muscle relaxers and ibuprofen for this.  He reports that this typically helps his pain.  Today pain seem to be worse than usual.  He notices it when he is bending forward and when laying flat.  Pain is nonradiating.  He rates his pain 7 out of 10.  He reports that it feels like a pressure but feels sharp when he takes a deep breath then. He denies any tearing or ripping chest pains. He has not had any direct trauma or injury to his chest however he does lift heavy objects at work.  He denies any associated shortness of breath, cough, congestion.  He denies any nausea or vomiting.  He denies sweats.  He denies peripheral edema.  He denies he calf pain.  He denies recent travel, recent surgery, recent immobilization.  He denies history of DVT, PE, or MI.  He does report heart disease in his mother but she has never had a heart attack.  He denies smoking cigarettes.  He has no further complaints.    [facility]  Medical History[1] reviewed by myself.    Meds  Current Outpatient Medications   Medication Instructions    acetaminophen (TYLENOL) 650 mg, oral, Every 6 hours PRN    albuterol (Ventolin HFA) 90 mcg/actuation inhaler 2 puffs, inhalation, Every 4 hours PRN    cholecalciferol (VITAMIN D-3) 2,000 Units, Daily    diclofenac sodium (Voltaren) 1 % gel APPLY 2GRAMS TOPICALLY FOUR TIMES DAILY    ibuprofen 600 mg, oral, 3 times daily, With food    pantoprazole (PROTONIX) 40 mg, oral, Daily before breakfast, Do not crush, chew, or split.       Allergies  RX Allergies[2] reviewed by myself.    SHx  Social History[3] reviewed by  myself.      ------------------------------------------------------------------------------------------------------------------------------------------    /87   Pulse 52   Temp 36.1 °C (97 °F) (Temporal)   Resp 14   SpO2 97%     Physical Exam  Vitals and nursing note reviewed.   Constitutional:       General: He is not in acute distress.     Appearance: Normal appearance. He is normal weight. He is not ill-appearing or toxic-appearing.   HENT:      Head: Normocephalic.      Nose: Nose normal.      Mouth/Throat:      Mouth: Mucous membranes are moist.   Eyes:      Extraocular Movements: Extraocular movements intact.      Conjunctiva/sclera: Conjunctivae normal.   Cardiovascular:      Rate and Rhythm: Regular rhythm. Bradycardia present.      Pulses:           Radial pulses are 2+ on the right side and 2+ on the left side.   Pulmonary:      Effort: Pulmonary effort is normal.      Breath sounds: Normal breath sounds.   Chest:      Chest wall: No tenderness.   Abdominal:      General: Abdomen is flat.      Palpations: Abdomen is soft.      Tenderness: There is no abdominal tenderness.   Musculoskeletal:         General: Normal range of motion.      Cervical back: Neck supple.      Right lower leg: No edema.      Left lower leg: No edema.   Skin:     General: Skin is warm and dry.   Neurological:      Mental Status: He is alert and oriented to person, place, and time.   Psychiatric:         Attention and Perception: Attention normal.         Mood and Affect: Mood normal.          ------------------------------------------------------------------------------------------------------------------------------------------  Labs  Labs Reviewed   CBC WITH AUTO DIFFERENTIAL - Abnormal       Result Value    WBC 4.0 (*)     nRBC 0.0      RBC 4.12 (*)     Hemoglobin 13.1 (*)     Hematocrit 39.6 (*)     MCV 96      MCH 31.8      MCHC 33.1      RDW 12.3      Platelets 183      Neutrophils % 31.9      Immature Granulocytes  %, Automated 0.0      Lymphocytes % 57.7      Monocytes % 8.2      Eosinophils % 1.5      Basophils % 0.7      Neutrophils Absolute 1.29      Immature Granulocytes Absolute, Automated 0.00      Lymphocytes Absolute 2.33      Monocytes Absolute 0.33      Eosinophils Absolute 0.06      Basophils Absolute 0.03     COMPREHENSIVE METABOLIC PANEL - Abnormal    Glucose 109 (*)     Sodium 138      Potassium 3.8      Chloride 108 (*)     Bicarbonate 24      Anion Gap 10      Urea Nitrogen 18      Creatinine 1.05      eGFR 88      Calcium 8.7      Albumin 4.0      Alkaline Phosphatase 49      Total Protein 6.8      AST 32      Bilirubin, Total 0.8      ALT 35     PROTIME-INR - Normal    Protime 11.2      INR 1.0     SERIAL TROPONIN-INITIAL - Normal    Troponin I, High Sensitivity 9      Narrative:     Less than 99th percentile of normal range cutoff-  Female and children under 18 years old <14 ng/L; Male <21 ng/L: Negative  Repeat testing should be performed if clinically indicated.     Female and children under 18 years old 14-50 ng/L; Male 21-50 ng/L:  Consistent with possible cardiac damage and possible increased clinical   risk. Serial measurements may help to assess extent of myocardial damage.     >50 ng/L: Consistent with cardiac damage, increased clinical risk and  myocardial infarction. Serial measurements may help assess extent of   myocardial damage.      NOTE: Children less than 1 year old may have higher baseline troponin   levels and results should be interpreted in conjunction with the overall   clinical context.     NOTE: Troponin I testing is performed using a different   testing methodology at [facility] than at other   [facility] hospitals. Direct result comparisons should only   be made within the same method.   SERIAL TROPONIN, 1 HOUR - Normal    Troponin I, High Sensitivity 8      Narrative:     Less than 99th percentile of normal range cutoff-  Female and children under 18 years old <14 ng/L;  Male <21 ng/L: Negative  Repeat testing should be performed if clinically indicated.     Female and children under 18 years old 14-50 ng/L; Male 21-50 ng/L:  Consistent with possible cardiac damage and possible increased clinical   risk. Serial measurements may help to assess extent of myocardial damage.     >50 ng/L: Consistent with cardiac damage, increased clinical risk and  myocardial infarction. Serial measurements may help assess extent of   myocardial damage.      NOTE: Children less than 1 year old may have higher baseline troponin   levels and results should be interpreted in conjunction with the overall   clinical context.     NOTE: Troponin I testing is performed using a different   testing methodology at [facility] than at other   [facility]. Direct result comparisons should only   be made within the same method.   D-DIMER, VTE EXCLUSION - Normal    D-Dimer, Quantitative VTE Exclusion 263      Narrative:     The VTE Exclusion D-Dimer assay is reported in ng/mL Fibrinogen Equivalent Units (FEU).    Per 's instructions for use, a value of less than 500 ng/mL (FEU) may help to exclude DVT or PE in outpatients when the assay is used with a clinical pretest probability assessment.(AE must utilize and document eCalc 'Wells Score Deep Vein Thrombosis Risk' for DVT exclusion only. Emergency Department should utilize  Guidelines for Emergency Department Use of the VTE Exclusion D-Dimer and Clinical Pretest probability assessment model for DVT or PE exclusion.)   TROPONIN SERIES- (INITIAL, 1 HR)    Narrative:     The following orders were created for panel order Troponin I Series, High Sensitivity (0, 1 HR).  Procedure                               Abnormality         Status                     ---------                               -----------         ------                     Troponin I, High Sensiti...[[id number]]  Normal              Final result               Troponin,  High Sensitivi...[[id number]]  Normal              Final result                 Please view results for these tests on the individual orders.        Imaging  XR chest 1 view   Final Result   1.  No evidence of acute cardiopulmonary process.                  MACRO:   None        Signed by: Loco Rai 6/6/2025 12:23 AM   Dictation workstation:   [id number]           ED Course  Diagnoses as of 06/06/25 0327   Chest pain, unspecified type        Medical Decision Making: He did not appear ill or toxic.  Vital signs reviewed and stable.  He was placed on continuous cardiac and pulse ox monitor.  Cardiac workup pursued.    Differential diagnoses considered: Musculoskeletal chest pain, ACS, pulmonary embolism, pleurisy, pneumonia, pericarditis, others    Medications given: IV Toradol    EKG interpreted by myself and ED attending: Sinus bradycardia.  Ventricular rate 57 bpm.  No acute ST elevations or depressions.      I reviewed the labs from today.  Leukopenia 4.0.  Hemoglobin 13.1 with hematocrit of 39.6.  Normal platelets.  CBC around his baseline.  BUN and creatinine normal.  Troponin x 2 negative.  Chest x-ray showing no acute cardiopulmonary process.  Discussed with patient about workup today.  His heart score is a 2 which is low risk for adverse cardiac event.  He reports improvement after the Toradol.  I discussed with him that I recommend he follow-up with cardiology as an outpatient.  I also recommend he follow-up with his PCP to discuss his ER visit today.  He is requesting a new prescription for the ibuprofen which he was provided with.  He is to return to the ER immediately if symptoms change or worsen.  He verbalized understanding and agreed to plan of care.  He was discharged home in stable condition.    Diagnosis: Chest pain  Plan: Discharge       [1]   Past Medical History:  Diagnosis Date    Bilateral carpal tunnel syndrome     History of dysuria     History of sciatica     History of solitary pulmonary  nodule     Intermittent chest pain     Leukopenia     Lumbosacral radiculitis     Lumbosacral strain     Radial styloid tenosynovitis (de quervain)     De Quervain's tenosynovitis    Tinea unguium     Onychomycosis of toenail   [2]   Allergies  Allergen Reactions    Gabapentin Swelling     angioedema   [3]   Social History  Tobacco Use    Smoking status: Some Days     Current packs/day: 0.00     Types: Cigars, Cigarettes     Last attempt to quit: 2018     Years since quittin.4     Passive exposure: Past    Smokeless tobacco: Never   Vaping Use    Vaping status: Never Used   Substance Use Topics    Alcohol use: Yes     Alcohol/week: 3.0 standard drinks of alcohol     Types: 3 Shots of liquor per week    Drug use: Never        Gonzalo Novoa PA-C  25 [phone]

## 2025-06-06 NOTE — DISCHARGE INSTRUCTIONS
Please follow-up with cardiology due to your 5-month history of chest pain.  I also recommend you follow with your PCP to discuss your ER visit today.  Return to the ER immediately if symptoms change or worsen

## 2025-06-13 PROCEDURE — 93005 ELECTROCARDIOGRAM TRACING: CPT

## 2025-06-14 LAB
ATRIAL RATE: 57 BPM
P AXIS: 42 DEGREES
P OFFSET: 182 MS
P ONSET: 118 MS
PR INTERVAL: 208 MS
Q ONSET: 222 MS
QRS COUNT: 10 BEATS
QRS DURATION: 100 MS
QT INTERVAL: 414 MS
QTC CALCULATION(BAZETT): 402 MS
QTC FREDERICIA: 407 MS
R AXIS: -36 DEGREES
T AXIS: 36 DEGREES
T OFFSET: 429 MS
VENTRICULAR RATE: 57 BPM

## 2025-09-30 ENCOUNTER — APPOINTMENT (OUTPATIENT)
Dept: PRIMARY CARE | Facility: CLINIC | Age: 50
End: 2025-09-30